# Patient Record
Sex: FEMALE | Race: WHITE | Employment: UNEMPLOYED | URBAN - METROPOLITAN AREA
[De-identification: names, ages, dates, MRNs, and addresses within clinical notes are randomized per-mention and may not be internally consistent; named-entity substitution may affect disease eponyms.]

---

## 2017-01-09 ENCOUNTER — TELEPHONE (OUTPATIENT)
Dept: NEUROLOGY | Facility: CLINIC | Age: 37
End: 2017-01-09

## 2017-01-09 ENCOUNTER — OFFICE VISIT (OUTPATIENT)
Dept: HEMATOLOGY/ONCOLOGY | Facility: HOSPITAL | Age: 37
End: 2017-01-09
Attending: Other
Payer: COMMERCIAL

## 2017-01-09 VITALS
WEIGHT: 147.63 LBS | SYSTOLIC BLOOD PRESSURE: 112 MMHG | RESPIRATION RATE: 20 BRPM | DIASTOLIC BLOOD PRESSURE: 66 MMHG | HEIGHT: 65 IN | HEART RATE: 70 BPM | BODY MASS INDEX: 24.6 KG/M2 | TEMPERATURE: 97 F

## 2017-01-09 DIAGNOSIS — G35 MULTIPLE SCLEROSIS (HCC): Primary | ICD-10-CM

## 2017-01-09 PROCEDURE — 96413 CHEMO IV INFUSION 1 HR: CPT

## 2017-01-09 NOTE — TELEPHONE ENCOUNTER
Tysabri authorized:  Authorization #[id number] from 4- through 5-. Last office visit on 7-29-15. Needs follow up. Patient notified of need for follow up appointment. Concerns over finances and states nothing has changed.  Discussed need to

## 2017-01-09 NOTE — PROGRESS NOTES
Education Record    Learner:  Patient    Disease / Diagnosis: MS    Barriers / Limitations:  None   Comments:    Method:  Brief focused   Comments:    General Topics:  Medication, Side effects and symptom management and Plan of care reviewed   Comments:

## 2017-01-17 ENCOUNTER — OFFICE VISIT (OUTPATIENT)
Dept: NEUROLOGY | Facility: CLINIC | Age: 37
End: 2017-01-17

## 2017-01-17 ENCOUNTER — TELEPHONE (OUTPATIENT)
Dept: NEUROLOGY | Facility: CLINIC | Age: 37
End: 2017-01-17

## 2017-01-17 VITALS
DIASTOLIC BLOOD PRESSURE: 70 MMHG | RESPIRATION RATE: 16 BRPM | WEIGHT: 149 LBS | BODY MASS INDEX: 24.83 KG/M2 | HEIGHT: 65 IN | HEART RATE: 68 BPM | SYSTOLIC BLOOD PRESSURE: 108 MMHG

## 2017-01-17 DIAGNOSIS — F41.9 ANXIETY AND DEPRESSION: ICD-10-CM

## 2017-01-17 DIAGNOSIS — G35 MULTIPLE SCLEROSIS (HCC): Primary | ICD-10-CM

## 2017-01-17 DIAGNOSIS — R53.83 OTHER FATIGUE: ICD-10-CM

## 2017-01-17 DIAGNOSIS — F32.A ANXIETY AND DEPRESSION: ICD-10-CM

## 2017-01-17 PROCEDURE — 99214 OFFICE O/P EST MOD 30 MIN: CPT | Performed by: PHYSICIAN ASSISTANT

## 2017-01-17 RX ORDER — MODAFINIL 200 MG/1
TABLET ORAL
Qty: 30 TABLET | Refills: 3 | Status: SHIPPED | OUTPATIENT
Start: 2017-01-17 | End: 2017-04-18

## 2017-01-17 RX ORDER — DULOXETIN HYDROCHLORIDE 30 MG/1
CAPSULE, DELAYED RELEASE ORAL
Qty: 60 CAPSULE | Refills: 5 | Status: SHIPPED | OUTPATIENT
Start: 2017-01-17 | End: 2017-07-26

## 2017-01-17 RX ORDER — HYDROCODONE BITARTRATE AND ACETAMINOPHEN 10; 325 MG/1; MG/1
1 TABLET ORAL EVERY 8 HOURS
COMMUNITY
Start: 2017-01-16

## 2017-01-17 NOTE — PROGRESS NOTES
HPI:    Patient ID: Jason Romano is a 39year old female. HPI     Patient is a 39year old female here for follow-up of MS. She is currently on the tysabri. She has been on it about 2 years now. She is tolerating it without side effects.  Previous is nervous/anxious.       Past Medical History   Diagnosis Date   • Closed fracture of lumbar vertebra without mention of spinal cord injury      Closed compression fracture of L2 vertebral body   • Multiple sclerosis (HCC)      No past surgical history on the left side. No drift on exam. FTN intact bilaterally. Slight difficulties with tandem gait. Slight swaying on romberg but able to correct. Skin: Skin is warm and dry.    Psychiatric: Her speech is normal and behavior is normal. Judgment and thought co

## 2017-01-17 NOTE — TELEPHONE ENCOUNTER
PA requested for modafinil. PA initiated via [email or web address], sent to plan, key: [id number] – PA Case ID: [id number] – Rx #: [id number]    Received approval via [employer]:    Approvedtoday  NSXMAE:[id number];ACPZTES Name:Modafinil (Provigil), Armodafinil (Nuv

## 2017-01-17 NOTE — PATIENT INSTRUCTIONS
Refill policies:    • Allow 2 business days for refills; controlled substances may take longer.   • Contact your pharmacy at least 5 days prior to running out of medication and have them send an electronic request or submit request through the “request re your physician has recommended that you have a procedure or additional testing performed. [facility]) will contact your insurance carrier to obtain pre-certification or prior authorization.     Unfortunately, MACIE has seen an increas

## 2017-01-17 NOTE — PROGRESS NOTES
Rx for Modafinil faxed to [facility] ([phone]) with confirmation received.

## 2017-01-31 ENCOUNTER — TELEPHONE (OUTPATIENT)
Dept: NEUROLOGY | Facility: CLINIC | Age: 37
End: 2017-01-31

## 2017-01-31 NOTE — TELEPHONE ENCOUNTER
Patient calling for orders for Tysabri. Orders entered in Therapy plan.   Patient authorized by touch program.

## 2017-02-01 NOTE — TELEPHONE ENCOUNTER
Patient calling to state [facility] needs orders. Patient notified that orders have been entered for six infusions.

## 2017-02-07 ENCOUNTER — OFFICE VISIT (OUTPATIENT)
Dept: HEMATOLOGY/ONCOLOGY | Facility: HOSPITAL | Age: 37
End: 2017-02-07
Attending: Other
Payer: COMMERCIAL

## 2017-02-07 VITALS
OXYGEN SATURATION: 100 % | DIASTOLIC BLOOD PRESSURE: 70 MMHG | BODY MASS INDEX: 23.74 KG/M2 | HEART RATE: 83 BPM | HEIGHT: 65 IN | RESPIRATION RATE: 18 BRPM | SYSTOLIC BLOOD PRESSURE: 109 MMHG | WEIGHT: 142.5 LBS | TEMPERATURE: 98 F

## 2017-02-07 DIAGNOSIS — G35 MULTIPLE SCLEROSIS (HCC): Primary | ICD-10-CM

## 2017-02-07 PROCEDURE — 96413 CHEMO IV INFUSION 1 HR: CPT

## 2017-02-07 NOTE — PROGRESS NOTES
Education Record    Learner:  Patient    Disease / Diagnosis:    Barriers / Limitations:  None   Comments:    Method:  Brief focused and Discussion   Comments:    General Topics:  Medication and Plan of care reviewed   Comments:    Outcome:  Shows Estephanie

## 2017-03-07 ENCOUNTER — APPOINTMENT (OUTPATIENT)
Dept: HEMATOLOGY/ONCOLOGY | Facility: HOSPITAL | Age: 37
End: 2017-03-07
Attending: Other
Payer: COMMERCIAL

## 2017-03-07 ENCOUNTER — TELEPHONE (OUTPATIENT)
Dept: HEMATOLOGY/ONCOLOGY | Facility: HOSPITAL | Age: 37
End: 2017-03-07

## 2017-03-20 ENCOUNTER — OFFICE VISIT (OUTPATIENT)
Dept: HEMATOLOGY/ONCOLOGY | Facility: HOSPITAL | Age: 37
End: 2017-03-20
Attending: Other
Payer: COMMERCIAL

## 2017-03-20 VITALS
TEMPERATURE: 98 F | RESPIRATION RATE: 18 BRPM | SYSTOLIC BLOOD PRESSURE: 106 MMHG | HEART RATE: 77 BPM | DIASTOLIC BLOOD PRESSURE: 62 MMHG

## 2017-03-20 DIAGNOSIS — G35 MULTIPLE SCLEROSIS (HCC): Primary | ICD-10-CM

## 2017-03-20 PROCEDURE — 96413 CHEMO IV INFUSION 1 HR: CPT

## 2017-03-20 NOTE — PROGRESS NOTES
Education Record    Learner:  Patient    Disease / Diagnosis: MS    Barriers / Limitations:  None   Comments:    Method:  Discussion and Printed material   Comments:    General Topics:  Medication, Side effects and symptom management and Plan of care revie

## 2017-04-18 ENCOUNTER — OFFICE VISIT (OUTPATIENT)
Dept: HEMATOLOGY/ONCOLOGY | Facility: HOSPITAL | Age: 37
End: 2017-04-18
Attending: Other
Payer: COMMERCIAL

## 2017-04-18 ENCOUNTER — TELEPHONE (OUTPATIENT)
Dept: NEUROLOGY | Facility: CLINIC | Age: 37
End: 2017-04-18

## 2017-04-18 VITALS
HEART RATE: 84 BPM | OXYGEN SATURATION: 100 % | RESPIRATION RATE: 18 BRPM | BODY MASS INDEX: 24.02 KG/M2 | WEIGHT: 144.19 LBS | DIASTOLIC BLOOD PRESSURE: 68 MMHG | SYSTOLIC BLOOD PRESSURE: 99 MMHG | HEIGHT: 65 IN | TEMPERATURE: 98 F

## 2017-04-18 DIAGNOSIS — G35 MULTIPLE SCLEROSIS (HCC): Primary | ICD-10-CM

## 2017-04-18 PROCEDURE — 96413 CHEMO IV INFUSION 1 HR: CPT

## 2017-04-18 RX ORDER — MODAFINIL 200 MG/1
TABLET ORAL
Qty: 30 TABLET | Refills: 3 | Status: SHIPPED
Start: 2017-04-18 | End: 2017-05-22

## 2017-04-18 NOTE — PROGRESS NOTES
Education Record    Learner:  Patient    Disease / Diagnosis:    Barriers / Limitations:  None   Comments:    Method:  Brief focused and Discussion   Comments:    General Topics:  Medication, Side effects and symptom management and Plan of care reviewed

## 2017-04-18 NOTE — TELEPHONE ENCOUNTER
Medication: Modafinil    Date of last refill: 1/17/2017 30/ with 3 refills  [place] verified fill on 4/17/2017 for 30 tabs/30 day supply    Last office visit: 1/17/2017  Due back to clinic per last office note:  RTC 6 mo

## 2017-04-18 NOTE — TELEPHONE ENCOUNTER
Tysabri authorization effective until 5/10/2017, needs new PA. Referral in [facility]. Case pending.

## 2017-04-19 ENCOUNTER — APPOINTMENT (OUTPATIENT)
Dept: HEMATOLOGY/ONCOLOGY | Facility: HOSPITAL | Age: 37
End: 2017-04-19
Attending: Other
Payer: COMMERCIAL

## 2017-04-24 NOTE — TELEPHONE ENCOUNTER
Called insurance [phone] now authorization with patients insurance has to go thru [place].  Spoke with Ayana she took all information and because she had to add the provider and facility it will take 24-48 hours to verify them, if they then needs cl

## 2017-05-05 NOTE — TELEPHONE ENCOUNTER
Received fax from [address] [place] [address] approved from 5/11/2017 - 11/6/17 - Authorization #[id number]

## 2017-05-22 RX ORDER — MODAFINIL 200 MG/1
TABLET ORAL
Qty: 30 TABLET | Refills: 3 | Status: SHIPPED | OUTPATIENT
Start: 2017-05-22 | End: 2017-10-16

## 2017-05-22 NOTE — TELEPHONE ENCOUNTER
Pt having issues with Rx. Originally sent to [facility]. Pt wanted it to go to [facility]. During the transfer Rx was lost. [facility] updated with new pharmacy info.

## 2017-05-23 ENCOUNTER — TELEPHONE (OUTPATIENT)
Dept: NEUROLOGY | Facility: CLINIC | Age: 37
End: 2017-05-23

## 2017-05-23 DIAGNOSIS — G35 MULTIPLE SCLEROSIS (HCC): Primary | ICD-10-CM

## 2017-05-23 NOTE — TELEPHONE ENCOUNTER
FDA mandated Tysabri reauthorization questionnaire completed online. Patient is JCV Negative, Index = 0.11. Last test date 12/9/2016, next test date due 06/9/2017. Has completed 33 Tysabri infusions with 0 courses of steroids in last 6 months.   Diana

## 2017-05-25 ENCOUNTER — APPOINTMENT (OUTPATIENT)
Dept: HEMATOLOGY/ONCOLOGY | Facility: HOSPITAL | Age: 37
End: 2017-05-25
Attending: Other
Payer: COMMERCIAL

## 2017-05-31 ENCOUNTER — OFFICE VISIT (OUTPATIENT)
Dept: HEMATOLOGY/ONCOLOGY | Facility: HOSPITAL | Age: 37
End: 2017-05-31
Attending: Other
Payer: COMMERCIAL

## 2017-05-31 VITALS
SYSTOLIC BLOOD PRESSURE: 109 MMHG | HEART RATE: 66 BPM | TEMPERATURE: 97 F | BODY MASS INDEX: 24 KG/M2 | DIASTOLIC BLOOD PRESSURE: 75 MMHG | WEIGHT: 146 LBS | RESPIRATION RATE: 18 BRPM | OXYGEN SATURATION: 100 %

## 2017-05-31 DIAGNOSIS — G35 MULTIPLE SCLEROSIS (HCC): Primary | ICD-10-CM

## 2017-05-31 PROCEDURE — 96413 CHEMO IV INFUSION 1 HR: CPT

## 2017-05-31 NOTE — PROGRESS NOTES
Education Record    Learner:  Patient    Disease / Diagnosis:MS    Barriers / Limitations:  None   Comments:    Method:  Discussion   Comments:    General Topics:  Side effects and symptom management, Plan of care reviewed and Fall risk and prevention   Co

## 2017-06-22 ENCOUNTER — TELEPHONE (OUTPATIENT)
Dept: NEUROLOGY | Facility: CLINIC | Age: 37
End: 2017-06-22

## 2017-06-28 ENCOUNTER — OFFICE VISIT (OUTPATIENT)
Dept: HEMATOLOGY/ONCOLOGY | Facility: HOSPITAL | Age: 37
End: 2017-06-28
Attending: Other
Payer: COMMERCIAL

## 2017-06-28 VITALS
TEMPERATURE: 97 F | HEART RATE: 71 BPM | BODY MASS INDEX: 23.27 KG/M2 | DIASTOLIC BLOOD PRESSURE: 58 MMHG | RESPIRATION RATE: 18 BRPM | SYSTOLIC BLOOD PRESSURE: 111 MMHG | OXYGEN SATURATION: 99 % | WEIGHT: 139.63 LBS | HEIGHT: 65 IN

## 2017-06-28 DIAGNOSIS — G35 MULTIPLE SCLEROSIS (HCC): Primary | ICD-10-CM

## 2017-06-28 PROCEDURE — 96413 CHEMO IV INFUSION 1 HR: CPT

## 2017-06-28 NOTE — PROGRESS NOTES
Education Record    Learner:  Patient    Disease / Diagnosis:MS    Barriers / Limitations:  None   Comments:    Method:  Discussion and Reinforcement   Comments:    General Topics:  Medication, Side effects and symptom management, Plan of care reviewed and

## 2017-07-03 LAB
INDEX VALUE: 0.13
JCV ANTIBODY: NEGATIVE

## 2017-07-26 ENCOUNTER — OFFICE VISIT (OUTPATIENT)
Dept: HEMATOLOGY/ONCOLOGY | Facility: HOSPITAL | Age: 37
End: 2017-07-26
Attending: Other
Payer: COMMERCIAL

## 2017-07-26 VITALS
OXYGEN SATURATION: 100 % | HEART RATE: 76 BPM | TEMPERATURE: 98 F | SYSTOLIC BLOOD PRESSURE: 103 MMHG | RESPIRATION RATE: 16 BRPM | DIASTOLIC BLOOD PRESSURE: 68 MMHG

## 2017-07-26 DIAGNOSIS — G35 MULTIPLE SCLEROSIS (HCC): Primary | ICD-10-CM

## 2017-07-26 PROCEDURE — 96413 CHEMO IV INFUSION 1 HR: CPT

## 2017-07-26 NOTE — PROGRESS NOTES
Pt arrived for tysabri infusion, pt states no to all questions pertaining to holding medication, pt alert and appears in nad, pt ambulates withs teady gait, pt denies any recent MS flare-ups    Education Record    Learner:  Patient    Disease / Diagnosis:M

## 2017-08-22 ENCOUNTER — TELEPHONE (OUTPATIENT)
Dept: HEMATOLOGY/ONCOLOGY | Facility: HOSPITAL | Age: 37
End: 2017-08-22

## 2017-08-22 ENCOUNTER — TELEPHONE (OUTPATIENT)
Dept: NEUROLOGY | Facility: CLINIC | Age: 37
End: 2017-08-22

## 2017-08-22 NOTE — TELEPHONE ENCOUNTER
S:  [facility] needs orders for Tysabri infusion tomorrow. B.  On Tysabri monthly infusion. Last infusion 07/26/17  Review of chart:      FDA mandated Tysabri reauthorization is up to date.   JCV current:  0/13 on 06/29/17.      Patient receives inf

## 2017-08-22 NOTE — TELEPHONE ENCOUNTER
Called Dr. Stanley Barker office and spoke to [address] [place] [address], asked to have Dr. Stanley Barker RN add more Tysabri orders in the therapy plan. Patient is scheduled for her next infusion tomorrow. Per Stacey, will let the RN know to put orders in the therapy plan.

## 2017-08-23 ENCOUNTER — APPOINTMENT (OUTPATIENT)
Dept: HEMATOLOGY/ONCOLOGY | Facility: HOSPITAL | Age: 37
End: 2017-08-23
Attending: Other
Payer: COMMERCIAL

## 2017-09-06 ENCOUNTER — OFFICE VISIT (OUTPATIENT)
Dept: HEMATOLOGY/ONCOLOGY | Facility: HOSPITAL | Age: 37
End: 2017-09-06
Attending: Other
Payer: COMMERCIAL

## 2017-09-06 VITALS
HEART RATE: 62 BPM | RESPIRATION RATE: 16 BRPM | OXYGEN SATURATION: 100 % | DIASTOLIC BLOOD PRESSURE: 65 MMHG | TEMPERATURE: 98 F | SYSTOLIC BLOOD PRESSURE: 99 MMHG

## 2017-09-06 DIAGNOSIS — G35 MULTIPLE SCLEROSIS (HCC): Primary | ICD-10-CM

## 2017-09-06 PROCEDURE — 96413 CHEMO IV INFUSION 1 HR: CPT

## 2017-09-06 NOTE — PROGRESS NOTES
Education Record    Learner:  Patient    Disease / Diagnosis:MS  Barriers / Limitations:  None   Comments:    Method:  Discussion   Comments:    General Topics:  Medication, Side effects and symptom management, Plan of care reviewed and Fall risk and preve

## 2017-10-04 ENCOUNTER — APPOINTMENT (OUTPATIENT)
Dept: HEMATOLOGY/ONCOLOGY | Facility: HOSPITAL | Age: 37
End: 2017-10-04
Attending: Other
Payer: COMMERCIAL

## 2017-10-11 ENCOUNTER — OFFICE VISIT (OUTPATIENT)
Dept: HEMATOLOGY/ONCOLOGY | Facility: HOSPITAL | Age: 37
End: 2017-10-11
Attending: Other
Payer: COMMERCIAL

## 2017-10-11 VITALS
HEIGHT: 65 IN | WEIGHT: 136.63 LBS | HEART RATE: 67 BPM | RESPIRATION RATE: 16 BRPM | TEMPERATURE: 97 F | SYSTOLIC BLOOD PRESSURE: 119 MMHG | BODY MASS INDEX: 22.76 KG/M2 | DIASTOLIC BLOOD PRESSURE: 70 MMHG

## 2017-10-11 DIAGNOSIS — G35 MULTIPLE SCLEROSIS (HCC): Primary | ICD-10-CM

## 2017-10-11 PROCEDURE — 96413 CHEMO IV INFUSION 1 HR: CPT

## 2017-10-11 PROCEDURE — 96365 THER/PROPH/DIAG IV INF INIT: CPT

## 2017-10-11 NOTE — PROGRESS NOTES
Education Record    Learner:  Patient    Disease / Diagnosis:MS    Barriers / Limitations:  None   Comments:    Method:  Discussion   Comments:    General Topics:  Infection, Medication, Precautions, Side effects and symptom management, Plan of care review

## 2017-10-16 DIAGNOSIS — G35 MULTIPLE SCLEROSIS (HCC): ICD-10-CM

## 2017-10-16 RX ORDER — MODAFINIL 200 MG/1
TABLET ORAL
Qty: 30 TABLET | Refills: 3 | Status: SHIPPED
Start: 2017-10-16 | End: 2018-04-02

## 2017-10-16 NOTE — TELEPHONE ENCOUNTER
Prescription approved for Provigil and faxed to [facility] pharmacy with receipt confirmation.

## 2017-10-16 NOTE — TELEPHONE ENCOUNTER
Medication: Modafinil    Date of last refill: 5/22/2017  [place] for this medication    Last office visit: 1/17/2017  Due back to clinic per last office note:  RTC in 6 months, due back July/Aug 2017  Date next office

## 2017-11-07 ENCOUNTER — TELEPHONE (OUTPATIENT)
Dept: NEUROLOGY | Facility: CLINIC | Age: 37
End: 2017-11-07

## 2017-11-07 DIAGNOSIS — G35 MULTIPLE SCLEROSIS (HCC): Primary | ICD-10-CM

## 2017-11-07 NOTE — TELEPHONE ENCOUNTER
FDA mandated Tysabri reauthorization questionnaire completed online. Patient is JCV Negative, Index = 0.13. Last test date 6/29/2017, next test date due 1/2018. Has completed 38 Tysabri infusions with 0 courses of steroids in last 6 months.   Reauthorized

## 2017-11-08 NOTE — TELEPHONE ENCOUNTER
Called insurance [phone] and spoke with Mary Jane Davison she said this needs to go thru [place] (she can not give me their # she said when we call [phone] press #4)    Was transferred I spoke with Teresa Hill she said that she will emmy this as Urgent [facility]

## 2017-11-09 ENCOUNTER — APPOINTMENT (OUTPATIENT)
Dept: HEMATOLOGY/ONCOLOGY | Facility: HOSPITAL | Age: 37
End: 2017-11-09
Attending: Other
Payer: COMMERCIAL

## 2017-11-09 ENCOUNTER — TELEPHONE (OUTPATIENT)
Dept: HEMATOLOGY/ONCOLOGY | Facility: HOSPITAL | Age: 37
End: 2017-11-09

## 2017-11-09 NOTE — TELEPHONE ENCOUNTER
Received request for information needed prior to approval of Tysabri. Called [facility] and spoke with Tamera Rodgers Pharm-D to provide clarification of patient's status with Tysabri. Approval received 11/8/17-05/06/18 for total #7 doses.   Authorization #

## 2017-11-09 NOTE — TELEPHONE ENCOUNTER
CALLED PT AND LEFT FOLLOWING MESSAGE: PT HAD APPT TODAY AT [facility] IN [place] AT 11AM FOR TYSABRI INFUSION AND WAS A NO SHOW. LEFT MESSAGE TO CALL OUR OFFICE AND LET US KNOW IF SHE WANTS TO RESCHEDULE.

## 2017-12-04 ENCOUNTER — OFFICE VISIT (OUTPATIENT)
Dept: HEMATOLOGY/ONCOLOGY | Facility: HOSPITAL | Age: 37
End: 2017-12-04
Attending: Other
Payer: COMMERCIAL

## 2017-12-04 VITALS
SYSTOLIC BLOOD PRESSURE: 107 MMHG | OXYGEN SATURATION: 98 % | TEMPERATURE: 98 F | HEART RATE: 67 BPM | RESPIRATION RATE: 16 BRPM | DIASTOLIC BLOOD PRESSURE: 68 MMHG

## 2017-12-04 DIAGNOSIS — G35 MULTIPLE SCLEROSIS (HCC): Primary | ICD-10-CM

## 2017-12-04 PROCEDURE — 96413 CHEMO IV INFUSION 1 HR: CPT

## 2017-12-04 PROCEDURE — 96365 THER/PROPH/DIAG IV INF INIT: CPT

## 2017-12-04 NOTE — PROGRESS NOTES
Education Record    Learner:  Patient    Disease / Diagnosis: Multiple sclerosis     Barriers / Limitations:  None   Comments:    Method:  Brief focused and Reinforcement   Comments:    General Topics:  Plan of care reviewed   Comments:    Outcome:  Shows

## 2017-12-07 ENCOUNTER — TELEPHONE (OUTPATIENT)
Dept: NEUROLOGY | Facility: CLINIC | Age: 37
End: 2017-12-07

## 2018-01-03 ENCOUNTER — TELEPHONE (OUTPATIENT)
Dept: NEUROLOGY | Facility: CLINIC | Age: 38
End: 2018-01-03

## 2018-01-03 DIAGNOSIS — G35 MULTIPLE SCLEROSIS (HCC): Primary | ICD-10-CM

## 2018-01-03 NOTE — TELEPHONE ENCOUNTER
Rec'd approval via [facility]: CaseId:[id number];Product Name:Modafinil (Provigil), Armodafinil (Nuvigil) [employer];Status:Approved; Coverage Start Date:12/04/2017;Coverage End Date:01/03/2019;

## 2018-01-08 ENCOUNTER — TELEPHONE (OUTPATIENT)
Dept: NEUROLOGY | Facility: CLINIC | Age: 38
End: 2018-01-08

## 2018-01-09 ENCOUNTER — OFFICE VISIT (OUTPATIENT)
Dept: HEMATOLOGY/ONCOLOGY | Facility: HOSPITAL | Age: 38
End: 2018-01-09
Attending: Other
Payer: COMMERCIAL

## 2018-01-09 VITALS
OXYGEN SATURATION: 96 % | RESPIRATION RATE: 20 BRPM | SYSTOLIC BLOOD PRESSURE: 115 MMHG | WEIGHT: 142 LBS | HEART RATE: 71 BPM | DIASTOLIC BLOOD PRESSURE: 76 MMHG | BODY MASS INDEX: 23.66 KG/M2 | HEIGHT: 65 IN | TEMPERATURE: 98 F

## 2018-01-09 DIAGNOSIS — G35 MULTIPLE SCLEROSIS (HCC): Primary | ICD-10-CM

## 2018-01-09 PROCEDURE — 96413 CHEMO IV INFUSION 1 HR: CPT

## 2018-01-09 PROCEDURE — 96365 THER/PROPH/DIAG IV INF INIT: CPT

## 2018-01-09 NOTE — PROGRESS NOTES
Pt given Tysabri over 1 hour. Pt refused to wait for 1 hr observation. VS post infusion: T-98.2, HR-71, RR-20, B/P 115/76. No complaints. Pt given next appt in 4 weeks.

## 2018-01-09 NOTE — PROGRESS NOTES
Education Record     Learner:  Patient     Disease / Diagnosis:MS     Barriers / Limitations:  None              Comments:     Method:  Brief focused and Reinforcement              Comments:     General Topics:  Medication, Side effects and symptom managem

## 2018-01-12 LAB
INDEX VALUE: 0.1
JCV ANTIBODY: NEGATIVE

## 2018-02-06 ENCOUNTER — OFFICE VISIT (OUTPATIENT)
Dept: HEMATOLOGY/ONCOLOGY | Facility: HOSPITAL | Age: 38
End: 2018-02-06
Attending: Other
Payer: COMMERCIAL

## 2018-02-06 VITALS
TEMPERATURE: 98 F | OXYGEN SATURATION: 100 % | BODY MASS INDEX: 24 KG/M2 | HEART RATE: 89 BPM | RESPIRATION RATE: 18 BRPM | WEIGHT: 142 LBS | DIASTOLIC BLOOD PRESSURE: 71 MMHG | SYSTOLIC BLOOD PRESSURE: 105 MMHG

## 2018-02-06 DIAGNOSIS — G35 MULTIPLE SCLEROSIS (HCC): Primary | ICD-10-CM

## 2018-02-06 PROCEDURE — 96365 THER/PROPH/DIAG IV INF INIT: CPT

## 2018-03-06 ENCOUNTER — APPOINTMENT (OUTPATIENT)
Dept: HEMATOLOGY/ONCOLOGY | Facility: HOSPITAL | Age: 38
End: 2018-03-06
Attending: Other
Payer: COMMERCIAL

## 2018-03-12 ENCOUNTER — TELEPHONE (OUTPATIENT)
Dept: HEMATOLOGY/ONCOLOGY | Facility: HOSPITAL | Age: 38
End: 2018-03-12

## 2018-03-15 ENCOUNTER — OFFICE VISIT (OUTPATIENT)
Dept: HEMATOLOGY/ONCOLOGY | Facility: HOSPITAL | Age: 38
End: 2018-03-15
Attending: Other
Payer: COMMERCIAL

## 2018-03-15 ENCOUNTER — TELEPHONE (OUTPATIENT)
Dept: NEUROLOGY | Facility: CLINIC | Age: 38
End: 2018-03-15

## 2018-03-15 DIAGNOSIS — G35 MULTIPLE SCLEROSIS (HCC): Primary | ICD-10-CM

## 2018-03-15 PROCEDURE — 96365 THER/PROPH/DIAG IV INF INIT: CPT

## 2018-03-15 NOTE — PROGRESS NOTES
Education Record    Learner:  Patient and Family Member    Disease / Diagnosis: MS    Barriers / Limitations:  None   Comments:    Method:  Brief focused   Comments:    General Topics:  Plan of care reviewed   Comments:    Outcome:  Shows understanding   C

## 2018-04-02 DIAGNOSIS — G35 MULTIPLE SCLEROSIS (HCC): ICD-10-CM

## 2018-04-02 RX ORDER — MODAFINIL 200 MG/1
TABLET ORAL
Qty: 30 TABLET | Refills: 0 | Status: SHIPPED
Start: 2018-04-02 | End: 2018-05-04

## 2018-04-02 NOTE — TELEPHONE ENCOUNTER
Medication: modafinil    Date of last refill: 10/16/17  [place] 1[place]    Last office visit: 1/17/17  Due back to clinic per last office note:  6 months  Date next office visit scheduled:  No future appointments.   Ibeth

## 2018-04-23 ENCOUNTER — OFFICE VISIT (OUTPATIENT)
Dept: HEMATOLOGY/ONCOLOGY | Facility: HOSPITAL | Age: 38
End: 2018-04-23
Attending: Other
Payer: COMMERCIAL

## 2018-04-23 VITALS
OXYGEN SATURATION: 100 % | SYSTOLIC BLOOD PRESSURE: 102 MMHG | HEIGHT: 65 IN | DIASTOLIC BLOOD PRESSURE: 67 MMHG | TEMPERATURE: 98 F | RESPIRATION RATE: 18 BRPM | HEART RATE: 66 BPM

## 2018-04-23 DIAGNOSIS — G35 MULTIPLE SCLEROSIS (HCC): Primary | ICD-10-CM

## 2018-04-23 PROCEDURE — 96365 THER/PROPH/DIAG IV INF INIT: CPT

## 2018-05-04 DIAGNOSIS — G35 MULTIPLE SCLEROSIS (HCC): ICD-10-CM

## 2018-05-04 NOTE — TELEPHONE ENCOUNTER
Medication: Modafinil 200 mg     Date of last refill: 04/02/18  [place]     Last office visit: 1/17/17  Due back to clinic per last office note:  6 months  Date next office visit scheduled:  No future appointments.   Carline

## 2018-05-07 RX ORDER — MODAFINIL 200 MG/1
TABLET ORAL
Qty: 30 TABLET | Refills: 0 | Status: SHIPPED
Start: 2018-05-07 | End: 2018-05-14

## 2018-05-14 ENCOUNTER — OFFICE VISIT (OUTPATIENT)
Dept: NEUROLOGY | Facility: CLINIC | Age: 38
End: 2018-05-14

## 2018-05-14 VITALS — DIASTOLIC BLOOD PRESSURE: 68 MMHG | RESPIRATION RATE: 18 BRPM | SYSTOLIC BLOOD PRESSURE: 98 MMHG | HEART RATE: 72 BPM

## 2018-05-14 DIAGNOSIS — M25.512 PAIN IN JOINT OF LEFT SHOULDER: ICD-10-CM

## 2018-05-14 DIAGNOSIS — G35 MULTIPLE SCLEROSIS (HCC): Primary | ICD-10-CM

## 2018-05-14 PROCEDURE — 99214 OFFICE O/P EST MOD 30 MIN: CPT | Performed by: PHYSICIAN ASSISTANT

## 2018-05-14 RX ORDER — MULTIVIT-MIN/IRON/FOLIC ACID/K 18-600-40
CAPSULE ORAL DAILY
COMMUNITY

## 2018-05-14 RX ORDER — DIAZEPAM 5 MG/1
TABLET ORAL
Qty: 2 TABLET | Refills: 0 | Status: SHIPPED | OUTPATIENT
Start: 2018-05-14 | End: 2019-06-24

## 2018-05-14 RX ORDER — MODAFINIL 200 MG/1
TABLET ORAL
Qty: 90 TABLET | Refills: 2 | Status: SHIPPED | OUTPATIENT
Start: 2018-05-14 | End: 2019-05-07

## 2018-05-14 NOTE — PROGRESS NOTES
[facility] with [address]  8/7/1980  Primary Care Provider:  None Pcp    5/14/2018    40year old female patient being seen for: MS    Patient states she has been stable.  Sh medications, lab results and radiology results, and the following relevant information are as noted in appropriate sections above and below.       EXAM:  BP 98/68   Pulse 72   Resp 18   Looks stated age  Pink conjunctiva anicteric sclerae, moist mucosa  No approximately:  ( ) 6-8 weeks    ( ) 3-4 months     () 6-8 months   (X ) yearly   ( ) As needed but knows to call if there are problems  ( ) Followup for special test  /or keep scheduled appointment  Patient understands that if needed, based on condition a

## 2018-05-14 NOTE — PATIENT INSTRUCTIONS
Refill policies:    • Allow 2-3 business days for refills; controlled substances may take longer.   • Contact your pharmacy at least 5 days prior to running out of medication and have them send an electronic request or submit request through the “request re entire amount billed. Precertification and Prior Authorizations: If your physician has recommended that you have a procedure or additional testing performed.   [facility]) will contact your insurance carrier to obtain pre-certi

## 2018-05-15 ENCOUNTER — TELEPHONE (OUTPATIENT)
Dept: NEUROLOGY | Facility: CLINIC | Age: 38
End: 2018-05-15

## 2018-05-15 NOTE — TELEPHONE ENCOUNTER
FDA mandated Tysabri reauthorization questionnaire completed online. Patient is JCV Negative, Index = 0.10. Last test date 1/9/2018, next test date due July 2018, given lab request at office visit.   Has completed 43 Tysabri infusions with 0 courses of tanika

## 2018-05-16 NOTE — TELEPHONE ENCOUNTER
Called insurance transferred to / [facility] #4 and spoke with Precious she took all information for re authorization of Tysabri .     This has to go through provider network verifcation (this is an out of state plan) then we will receive infor

## 2018-05-21 NOTE — TELEPHONE ENCOUNTER
Fax received from [employer]. Reed Agustin has been approved for this member effective 5/16/2018 through 11/11/2018.     If member requires additional services beyond Nov 11, 2018, please contact [place] utilization management before the last ap

## 2018-06-08 ENCOUNTER — OFFICE VISIT (OUTPATIENT)
Dept: HEMATOLOGY/ONCOLOGY | Facility: HOSPITAL | Age: 38
End: 2018-06-08
Attending: Other
Payer: COMMERCIAL

## 2018-06-08 VITALS
HEART RATE: 81 BPM | SYSTOLIC BLOOD PRESSURE: 111 MMHG | DIASTOLIC BLOOD PRESSURE: 74 MMHG | TEMPERATURE: 98 F | RESPIRATION RATE: 18 BRPM

## 2018-06-08 DIAGNOSIS — G35 MULTIPLE SCLEROSIS (HCC): Primary | ICD-10-CM

## 2018-06-08 PROCEDURE — 96365 THER/PROPH/DIAG IV INF INIT: CPT

## 2018-06-13 ENCOUNTER — TELEPHONE (OUTPATIENT)
Dept: NEUROLOGY | Facility: CLINIC | Age: 38
End: 2018-06-13

## 2018-07-05 ENCOUNTER — TELEPHONE (OUTPATIENT)
Dept: NEUROLOGY | Facility: CLINIC | Age: 38
End: 2018-07-05

## 2018-07-05 NOTE — TELEPHONE ENCOUNTER
Current MS patient who infuses monthly with Tysabri. [address] calling for new therapy plan to be placed in [facility]. Therapy plan updated with 6 more infusions. Insurance authorization is valid through Nov 2018.

## 2018-07-06 ENCOUNTER — TELEPHONE (OUTPATIENT)
Dept: HEMATOLOGY/ONCOLOGY | Facility: HOSPITAL | Age: 38
End: 2018-07-06

## 2018-07-19 ENCOUNTER — TELEPHONE (OUTPATIENT)
Dept: NEUROLOGY | Facility: CLINIC | Age: 38
End: 2018-07-19

## 2018-07-26 ENCOUNTER — TELEPHONE (OUTPATIENT)
Dept: NEUROLOGY | Facility: CLINIC | Age: 38
End: 2018-07-26

## 2018-07-30 NOTE — TELEPHONE ENCOUNTER
Called [facility] and spoke with Jasmine Thomas for ,[id number] both codes are valid and billable no authorization or pre determination needed. Call reference #[id number].  Time on call 4:51

## 2018-08-02 ENCOUNTER — OFFICE VISIT (OUTPATIENT)
Dept: HEMATOLOGY/ONCOLOGY | Facility: HOSPITAL | Age: 38
End: 2018-08-02
Attending: Other
Payer: COMMERCIAL

## 2018-08-02 VITALS
RESPIRATION RATE: 18 BRPM | SYSTOLIC BLOOD PRESSURE: 119 MMHG | TEMPERATURE: 98 F | HEART RATE: 69 BPM | DIASTOLIC BLOOD PRESSURE: 78 MMHG

## 2018-08-02 DIAGNOSIS — G35 MULTIPLE SCLEROSIS (HCC): Primary | ICD-10-CM

## 2018-08-02 PROCEDURE — 96365 THER/PROPH/DIAG IV INF INIT: CPT

## 2018-08-02 RX ORDER — DIPHENHYDRAMINE HYDROCHLORIDE 50 MG/ML
25 INJECTION INTRAMUSCULAR; INTRAVENOUS ONCE
Status: CANCELLED | OUTPATIENT
Start: 2018-08-02

## 2018-08-02 RX ORDER — FAMOTIDINE 10 MG/ML
20 INJECTION, SOLUTION INTRAVENOUS ONCE
Status: CANCELLED | OUTPATIENT
Start: 2018-08-02

## 2018-08-02 RX ORDER — METHYLPREDNISOLONE SODIUM SUCCINATE 125 MG/2ML
125 INJECTION, POWDER, LYOPHILIZED, FOR SOLUTION INTRAMUSCULAR; INTRAVENOUS ONCE
Status: CANCELLED | OUTPATIENT
Start: 2018-08-02

## 2018-08-02 RX ORDER — METHYLPREDNISOLONE SODIUM SUCCINATE 40 MG/ML
40 INJECTION, POWDER, LYOPHILIZED, FOR SOLUTION INTRAMUSCULAR; INTRAVENOUS ONCE
Status: CANCELLED | OUTPATIENT
Start: 2018-08-02

## 2018-08-02 RX ORDER — MEPERIDINE HYDROCHLORIDE 25 MG/ML
25 INJECTION INTRAMUSCULAR; INTRAVENOUS; SUBCUTANEOUS ONCE
Status: CANCELLED | OUTPATIENT
Start: 2018-08-02

## 2018-08-02 NOTE — PROGRESS NOTES
Education Record    Learner:  Patient    Disease / Diagnosis: Multiple Sclerosis    Barriers / Limitations:  None   Comments:    Method:  Brief focused and Discussion   Comments:    General Topics:  Medication and Plan of care reviewed   Comments:    Rena Rivera

## 2018-08-30 ENCOUNTER — APPOINTMENT (OUTPATIENT)
Dept: HEMATOLOGY/ONCOLOGY | Facility: HOSPITAL | Age: 38
End: 2018-08-30
Attending: Other
Payer: COMMERCIAL

## 2018-09-17 ENCOUNTER — OFFICE VISIT (OUTPATIENT)
Dept: HEMATOLOGY/ONCOLOGY | Facility: HOSPITAL | Age: 38
End: 2018-09-17
Attending: Other
Payer: COMMERCIAL

## 2018-09-17 VITALS
OXYGEN SATURATION: 98 % | TEMPERATURE: 100 F | HEART RATE: 80 BPM | RESPIRATION RATE: 18 BRPM | SYSTOLIC BLOOD PRESSURE: 125 MMHG | DIASTOLIC BLOOD PRESSURE: 75 MMHG

## 2018-09-17 DIAGNOSIS — G35 MULTIPLE SCLEROSIS (HCC): Primary | ICD-10-CM

## 2018-09-17 PROCEDURE — 96365 THER/PROPH/DIAG IV INF INIT: CPT

## 2018-09-17 RX ORDER — MEPERIDINE HYDROCHLORIDE 25 MG/ML
25 INJECTION INTRAMUSCULAR; INTRAVENOUS; SUBCUTANEOUS ONCE
Status: CANCELLED | OUTPATIENT
Start: 2018-09-17

## 2018-09-17 RX ORDER — METHYLPREDNISOLONE SODIUM SUCCINATE 40 MG/ML
40 INJECTION, POWDER, LYOPHILIZED, FOR SOLUTION INTRAMUSCULAR; INTRAVENOUS ONCE
Status: CANCELLED | OUTPATIENT
Start: 2018-09-17

## 2018-09-17 RX ORDER — DIPHENHYDRAMINE HYDROCHLORIDE 50 MG/ML
25 INJECTION INTRAMUSCULAR; INTRAVENOUS ONCE
Status: CANCELLED | OUTPATIENT
Start: 2018-09-17

## 2018-09-17 RX ORDER — FAMOTIDINE 10 MG/ML
20 INJECTION, SOLUTION INTRAVENOUS ONCE
Status: CANCELLED | OUTPATIENT
Start: 2018-09-17

## 2018-09-17 RX ORDER — METHYLPREDNISOLONE SODIUM SUCCINATE 125 MG/2ML
125 INJECTION, POWDER, LYOPHILIZED, FOR SOLUTION INTRAMUSCULAR; INTRAVENOUS ONCE
Status: CANCELLED | OUTPATIENT
Start: 2018-09-17

## 2018-09-17 NOTE — PROGRESS NOTES
Education Record    Learner:  Patient and Spouse    Disease / Diagnosis: Multiple Sclerosis    Barriers / Limitations:  None   Comments:    Method:  Brief focused   Comments:    General Topics:  Plan of care reviewed   Comments:    Outcome:  Shows Estephanie

## 2018-10-15 ENCOUNTER — APPOINTMENT (OUTPATIENT)
Dept: HEMATOLOGY/ONCOLOGY | Facility: HOSPITAL | Age: 38
End: 2018-10-15
Attending: Other
Payer: COMMERCIAL

## 2018-11-01 ENCOUNTER — TELEPHONE (OUTPATIENT)
Dept: NEUROLOGY | Facility: CLINIC | Age: 38
End: 2018-11-01

## 2018-11-01 DIAGNOSIS — G35 MULTIPLE SCLEROSIS (HCC): Primary | ICD-10-CM

## 2018-11-01 NOTE — TELEPHONE ENCOUNTER
Request received for patient's Modafinil. Prior authorization completed through cover my meds. Authorization granted effective 11/1/2018 through 11/1/2019.     Pharmacy notified of approval.

## 2018-11-05 ENCOUNTER — TELEPHONE (OUTPATIENT)
Dept: NEUROLOGY | Facility: CLINIC | Age: 38
End: 2018-11-05

## 2018-11-06 NOTE — TELEPHONE ENCOUNTER
Faxed predetermination to [facility] with clinicals for codes [id number]. Confirmed fax received.

## 2018-11-13 NOTE — TELEPHONE ENCOUNTER
Authorization previously approved. Effective 11/1/2018 through 11/1/2019. Faxed to pharmacy with receipt confirmation.

## 2018-11-13 NOTE — TELEPHONE ENCOUNTER
Prior authorization resubmitted with new insurance. Case Key [id number] pending through cover my meds.

## 2018-11-20 NOTE — TELEPHONE ENCOUNTER
Fax received from [employer]. Edna Schwarz has been approved for use at [facility] effective:    11/09/2018 through 11/09/2019.

## 2018-12-03 ENCOUNTER — TELEPHONE (OUTPATIENT)
Dept: NEUROLOGY | Facility: CLINIC | Age: 38
End: 2018-12-03

## 2018-12-03 ENCOUNTER — OFFICE VISIT (OUTPATIENT)
Dept: HEMATOLOGY/ONCOLOGY | Facility: HOSPITAL | Age: 38
End: 2018-12-03
Attending: Other
Payer: COMMERCIAL

## 2018-12-03 VITALS
SYSTOLIC BLOOD PRESSURE: 109 MMHG | TEMPERATURE: 96 F | HEART RATE: 78 BPM | RESPIRATION RATE: 16 BRPM | DIASTOLIC BLOOD PRESSURE: 64 MMHG

## 2018-12-03 DIAGNOSIS — G35 MULTIPLE SCLEROSIS (HCC): Primary | ICD-10-CM

## 2018-12-03 PROCEDURE — 96365 THER/PROPH/DIAG IV INF INIT: CPT

## 2018-12-03 RX ORDER — FAMOTIDINE 10 MG/ML
20 INJECTION, SOLUTION INTRAVENOUS ONCE
Status: CANCELLED | OUTPATIENT
Start: 2018-12-03

## 2018-12-03 RX ORDER — METHYLPREDNISOLONE SODIUM SUCCINATE 40 MG/ML
40 INJECTION, POWDER, LYOPHILIZED, FOR SOLUTION INTRAMUSCULAR; INTRAVENOUS ONCE
Status: CANCELLED | OUTPATIENT
Start: 2018-12-03

## 2018-12-03 RX ORDER — DIPHENHYDRAMINE HYDROCHLORIDE 50 MG/ML
25 INJECTION INTRAMUSCULAR; INTRAVENOUS ONCE
Status: CANCELLED | OUTPATIENT
Start: 2018-12-03

## 2018-12-03 RX ORDER — METHYLPREDNISOLONE SODIUM SUCCINATE 125 MG/2ML
125 INJECTION, POWDER, LYOPHILIZED, FOR SOLUTION INTRAMUSCULAR; INTRAVENOUS ONCE
Status: CANCELLED | OUTPATIENT
Start: 2018-12-03

## 2018-12-03 RX ORDER — MEPERIDINE HYDROCHLORIDE 25 MG/ML
25 INJECTION INTRAMUSCULAR; INTRAVENOUS; SUBCUTANEOUS ONCE
Status: CANCELLED | OUTPATIENT
Start: 2018-12-03

## 2018-12-03 NOTE — TELEPHONE ENCOUNTER
Patient has Tysabri infusion today and requires re-authorization in Touch program.    Patient is due for JCV testing, last performed 1/9/18. Order in system. Order mailed to patient's home address.     Patient re-authorized via [email or web address] to continue

## 2018-12-03 NOTE — PROGRESS NOTES
Education Record    Learner:  Patient    Disease / Diagnosis:MS    Barriers / Limitations:  None   Comments:    Method:  Discussion   Comments:    General Topics:  Medication   Comments:    Outcome:  Shows understanding   Comments:

## 2018-12-04 NOTE — TELEPHONE ENCOUNTER
FDA mandated Tysabri reauthorization questionnaire completed online. Patient is JCV Neg, Index = 0.1. Last test date Jan 2018, next test date due now. Has completed 47 Tysabri infusions with 0 courses of steroids in last 6 months.   Reauthorized to contin

## 2018-12-11 ENCOUNTER — TELEPHONE (OUTPATIENT)
Dept: NEUROLOGY | Facility: CLINIC | Age: 38
End: 2018-12-11

## 2018-12-31 ENCOUNTER — APPOINTMENT (OUTPATIENT)
Dept: HEMATOLOGY/ONCOLOGY | Facility: HOSPITAL | Age: 38
End: 2018-12-31
Attending: Other
Payer: COMMERCIAL

## 2019-02-07 ENCOUNTER — NURSE ONLY (OUTPATIENT)
Dept: FAMILY MEDICINE CLINIC | Facility: CLINIC | Age: 39
End: 2019-02-07
Payer: COMMERCIAL

## 2019-02-07 VITALS
SYSTOLIC BLOOD PRESSURE: 100 MMHG | HEART RATE: 76 BPM | WEIGHT: 150 LBS | BODY MASS INDEX: 24.99 KG/M2 | HEIGHT: 65 IN | RESPIRATION RATE: 16 BRPM | OXYGEN SATURATION: 99 % | DIASTOLIC BLOOD PRESSURE: 70 MMHG | TEMPERATURE: 98 F

## 2019-02-07 DIAGNOSIS — H92.02 OTALGIA, LEFT: ICD-10-CM

## 2019-02-07 DIAGNOSIS — J02.9 ACUTE PHARYNGITIS, UNSPECIFIED ETIOLOGY: Primary | ICD-10-CM

## 2019-02-07 LAB
CONTROL LINE PRESENT WITH A CLEAR BACKGROUND (YES/NO): YES YES/NO
STREP GRP A CUL-SCR: NEGATIVE

## 2019-02-07 PROCEDURE — 87880 STREP A ASSAY W/OPTIC: CPT | Performed by: NURSE PRACTITIONER

## 2019-02-07 PROCEDURE — 99213 OFFICE O/P EST LOW 20 MIN: CPT | Performed by: NURSE PRACTITIONER

## 2019-02-07 PROCEDURE — 87081 CULTURE SCREEN ONLY: CPT | Performed by: NURSE PRACTITIONER

## 2019-02-07 NOTE — PROGRESS NOTES
CHIEF COMPLAINT:   Patient presents with:  Ear Pain  Sore Throat        HPI:   Kelle Le is a 45year old female presents to clinic with complaint of sore throat. Reports yesterday had mild left sided sore throat but resolved later in the day. SKIN: denies any unusual skin lesions or rashes  HEENT: See HPI  RESPIRATORY: denies shortness of breath or wheezing  CARDIOVASCULAR: denies chest pain or palpitations   GI: denies vomiting or diarrhea  NEURO: denies dizziness or lightheadedness    EXAM: Will send throat culture. Discussed possibility of mononucleosis infection, but at this time symptoms are not reflective of mono infection. If s/sx persist will consider MonoSpot or further lab work.    Comfort measures explained and discussed as listed i · Use the antibiotic medicine for the full 10 days. Do not stop the medicine even if you or your child feel better. This is very important to make sure the infection is fully treated. It is also important to prevent medicine-resistant germs from growing.  I ? Your child is younger than 3years of age and has a fever of 100.4°F (38°C) for more than 1 day. ? Your child is 3years old or older and has a fever of 100.4°F (38°C) for more than 3 days.   · New or worsening ear pain, sinus pain, or headache  · Painfu

## 2019-02-10 ENCOUNTER — TELEPHONE (OUTPATIENT)
Dept: FAMILY MEDICINE CLINIC | Facility: CLINIC | Age: 39
End: 2019-02-10

## 2019-02-20 ENCOUNTER — OFFICE VISIT (OUTPATIENT)
Dept: HEMATOLOGY/ONCOLOGY | Facility: HOSPITAL | Age: 39
End: 2019-02-20
Attending: Other
Payer: COMMERCIAL

## 2019-02-20 VITALS
RESPIRATION RATE: 18 BRPM | TEMPERATURE: 98 F | HEART RATE: 98 BPM | DIASTOLIC BLOOD PRESSURE: 83 MMHG | SYSTOLIC BLOOD PRESSURE: 138 MMHG | OXYGEN SATURATION: 100 %

## 2019-02-20 DIAGNOSIS — G35 MULTIPLE SCLEROSIS (HCC): Primary | ICD-10-CM

## 2019-02-20 PROCEDURE — 96365 THER/PROPH/DIAG IV INF INIT: CPT

## 2019-02-20 RX ORDER — MEPERIDINE HYDROCHLORIDE 25 MG/ML
25 INJECTION INTRAMUSCULAR; INTRAVENOUS; SUBCUTANEOUS ONCE
Status: CANCELLED | OUTPATIENT
Start: 2019-02-20

## 2019-02-20 RX ORDER — FAMOTIDINE 10 MG/ML
20 INJECTION, SOLUTION INTRAVENOUS ONCE
Status: CANCELLED | OUTPATIENT
Start: 2019-02-20

## 2019-02-20 RX ORDER — DIPHENHYDRAMINE HYDROCHLORIDE 50 MG/ML
25 INJECTION INTRAMUSCULAR; INTRAVENOUS ONCE
Status: CANCELLED | OUTPATIENT
Start: 2019-02-20

## 2019-02-20 RX ORDER — METHYLPREDNISOLONE SODIUM SUCCINATE 40 MG/ML
40 INJECTION, POWDER, LYOPHILIZED, FOR SOLUTION INTRAMUSCULAR; INTRAVENOUS ONCE
Status: CANCELLED | OUTPATIENT
Start: 2019-02-20

## 2019-02-20 RX ORDER — METHYLPREDNISOLONE SODIUM SUCCINATE 125 MG/2ML
125 INJECTION, POWDER, LYOPHILIZED, FOR SOLUTION INTRAMUSCULAR; INTRAVENOUS ONCE
Status: CANCELLED | OUTPATIENT
Start: 2019-02-20

## 2019-02-20 NOTE — PROGRESS NOTES
Education Record    Learner:  Patient    Disease / Diagnosis: MS - tysabri    Barriers / Limitations:  None   Comments:    Method:  Brief focused   Comments:    General Topics:  Plan of care reviewed   Comments:    Outcome:  Shows understanding   Comments:

## 2019-03-20 ENCOUNTER — APPOINTMENT (OUTPATIENT)
Dept: HEMATOLOGY/ONCOLOGY | Facility: HOSPITAL | Age: 39
End: 2019-03-20
Attending: Other
Payer: COMMERCIAL

## 2019-05-06 ENCOUNTER — OFFICE VISIT (OUTPATIENT)
Dept: HEMATOLOGY/ONCOLOGY | Facility: HOSPITAL | Age: 39
End: 2019-05-06
Attending: Other
Payer: COMMERCIAL

## 2019-05-06 VITALS
RESPIRATION RATE: 16 BRPM | TEMPERATURE: 99 F | BODY MASS INDEX: 26 KG/M2 | WEIGHT: 158.63 LBS | OXYGEN SATURATION: 99 % | DIASTOLIC BLOOD PRESSURE: 79 MMHG | HEART RATE: 62 BPM | SYSTOLIC BLOOD PRESSURE: 122 MMHG

## 2019-05-06 DIAGNOSIS — G35 MULTIPLE SCLEROSIS (HCC): Primary | ICD-10-CM

## 2019-05-06 PROCEDURE — 96365 THER/PROPH/DIAG IV INF INIT: CPT

## 2019-05-06 RX ORDER — FAMOTIDINE 10 MG/ML
20 INJECTION, SOLUTION INTRAVENOUS ONCE
Status: CANCELLED | OUTPATIENT
Start: 2019-05-13

## 2019-05-06 RX ORDER — METHYLPREDNISOLONE SODIUM SUCCINATE 40 MG/ML
40 INJECTION, POWDER, LYOPHILIZED, FOR SOLUTION INTRAMUSCULAR; INTRAVENOUS ONCE
Status: CANCELLED | OUTPATIENT
Start: 2019-05-13

## 2019-05-06 RX ORDER — MEPERIDINE HYDROCHLORIDE 25 MG/ML
25 INJECTION INTRAMUSCULAR; INTRAVENOUS; SUBCUTANEOUS ONCE
Status: CANCELLED | OUTPATIENT
Start: 2019-05-13

## 2019-05-06 RX ORDER — METHYLPREDNISOLONE SODIUM SUCCINATE 125 MG/2ML
125 INJECTION, POWDER, LYOPHILIZED, FOR SOLUTION INTRAMUSCULAR; INTRAVENOUS ONCE
Status: CANCELLED | OUTPATIENT
Start: 2019-05-13

## 2019-05-06 RX ORDER — DIPHENHYDRAMINE HYDROCHLORIDE 50 MG/ML
25 INJECTION INTRAMUSCULAR; INTRAVENOUS ONCE
Status: CANCELLED | OUTPATIENT
Start: 2019-05-13

## 2019-05-06 NOTE — PROGRESS NOTES
Education Record    Learner:  Patient    Disease / Diagnosis: Multiple Sclerosis    Barriers / Limitations:  None   Comments:    Method:  Brief focused and Reinforcement   Comments:    General Topics:  Plan of care reviewed   Comments:    Outcome:  Shows u

## 2019-05-07 DIAGNOSIS — G35 MULTIPLE SCLEROSIS (HCC): ICD-10-CM

## 2019-05-07 RX ORDER — MODAFINIL 200 MG/1
TABLET ORAL
Qty: 90 TABLET | Refills: 0 | Status: SHIPPED
Start: 2019-05-07 | End: 2019-06-24

## 2019-05-07 NOTE — TELEPHONE ENCOUNTER
Medication: Modafinil 200 mg    Date of last refill: 05/14/18 with 2 addt refills # 90  [place]     Last office visit: 05/14/18  Due back to clinic per last office note:  RTN in 1 year by 05/14/2019  Date next office vis Winnie Georges PA-C  [facility]  5/14/2018, Time completed 1:10 PM     Decision making:  (  ) labs reviewed/ordered - 1  (  ) new diagnosis: - 1  (  ) Images & studies independently reviewed -non F2F  (  ) Case/studies discussed with ot

## 2019-05-21 ENCOUNTER — TELEPHONE (OUTPATIENT)
Dept: NEUROLOGY | Facility: CLINIC | Age: 39
End: 2019-05-21

## 2019-05-21 DIAGNOSIS — G35 MULTIPLE SCLEROSIS (HCC): Primary | ICD-10-CM

## 2019-05-21 NOTE — TELEPHONE ENCOUNTER
Patient is due for her JCV index and follow up appointment. My chart message sent to patient, lab request mailed to home address on file.

## 2019-06-04 ENCOUNTER — OFFICE VISIT (OUTPATIENT)
Dept: HEMATOLOGY/ONCOLOGY | Facility: HOSPITAL | Age: 39
End: 2019-06-04
Attending: Other
Payer: COMMERCIAL

## 2019-06-04 VITALS
TEMPERATURE: 97 F | DIASTOLIC BLOOD PRESSURE: 82 MMHG | HEART RATE: 74 BPM | RESPIRATION RATE: 16 BRPM | SYSTOLIC BLOOD PRESSURE: 127 MMHG | OXYGEN SATURATION: 100 %

## 2019-06-04 DIAGNOSIS — G35 MULTIPLE SCLEROSIS (HCC): Primary | ICD-10-CM

## 2019-06-04 PROCEDURE — 96365 THER/PROPH/DIAG IV INF INIT: CPT

## 2019-06-04 RX ORDER — METHYLPREDNISOLONE SODIUM SUCCINATE 125 MG/2ML
125 INJECTION, POWDER, LYOPHILIZED, FOR SOLUTION INTRAMUSCULAR; INTRAVENOUS ONCE
Status: CANCELLED | OUTPATIENT
Start: 2019-06-04

## 2019-06-04 RX ORDER — FAMOTIDINE 10 MG/ML
20 INJECTION, SOLUTION INTRAVENOUS ONCE
Status: CANCELLED | OUTPATIENT
Start: 2019-06-04

## 2019-06-04 RX ORDER — METHYLPREDNISOLONE SODIUM SUCCINATE 40 MG/ML
40 INJECTION, POWDER, LYOPHILIZED, FOR SOLUTION INTRAMUSCULAR; INTRAVENOUS ONCE
Status: CANCELLED | OUTPATIENT
Start: 2019-06-04

## 2019-06-04 RX ORDER — MEPERIDINE HYDROCHLORIDE 25 MG/ML
25 INJECTION INTRAMUSCULAR; INTRAVENOUS; SUBCUTANEOUS ONCE
Status: CANCELLED | OUTPATIENT
Start: 2019-06-04

## 2019-06-04 RX ORDER — DIPHENHYDRAMINE HYDROCHLORIDE 50 MG/ML
25 INJECTION INTRAMUSCULAR; INTRAVENOUS ONCE
Status: CANCELLED | OUTPATIENT
Start: 2019-06-04

## 2019-06-04 NOTE — PROGRESS NOTES
Education Record    Learner:  Patient    Disease / Diagnosis: tysabri, last of order; patient aware she will get new order sent over for next month. Already authorized by touch until December of this year.      Barriers / Limitations:  None   Comments:    TUTU

## 2019-06-24 ENCOUNTER — TELEPHONE (OUTPATIENT)
Dept: NEUROLOGY | Facility: CLINIC | Age: 39
End: 2019-06-24

## 2019-06-24 ENCOUNTER — OFFICE VISIT (OUTPATIENT)
Dept: NEUROLOGY | Facility: CLINIC | Age: 39
End: 2019-06-24
Payer: COMMERCIAL

## 2019-06-24 VITALS
HEIGHT: 66 IN | DIASTOLIC BLOOD PRESSURE: 72 MMHG | WEIGHT: 157 LBS | BODY MASS INDEX: 25.23 KG/M2 | SYSTOLIC BLOOD PRESSURE: 113 MMHG | HEART RATE: 91 BPM | RESPIRATION RATE: 16 BRPM

## 2019-06-24 DIAGNOSIS — G35 MULTIPLE SCLEROSIS (HCC): Primary | ICD-10-CM

## 2019-06-24 DIAGNOSIS — F32.A ANXIETY AND DEPRESSION: ICD-10-CM

## 2019-06-24 DIAGNOSIS — F41.9 ANXIETY AND DEPRESSION: ICD-10-CM

## 2019-06-24 DIAGNOSIS — R53.83 OTHER FATIGUE: ICD-10-CM

## 2019-06-24 PROCEDURE — 99214 OFFICE O/P EST MOD 30 MIN: CPT | Performed by: PHYSICIAN ASSISTANT

## 2019-06-24 RX ORDER — PYRIDOXINE HCL (VITAMIN B6) 50 MG
TABLET ORAL
COMMUNITY
End: 2021-06-14

## 2019-06-24 RX ORDER — DIAZEPAM 5 MG/1
TABLET ORAL
Qty: 2 TABLET | Refills: 0 | Status: SHIPPED | OUTPATIENT
Start: 2019-06-24 | End: 2021-06-14 | Stop reason: ALTCHOICE

## 2019-06-24 RX ORDER — MODAFINIL 200 MG/1
TABLET ORAL
Qty: 90 TABLET | Refills: 2 | Status: SHIPPED | OUTPATIENT
Start: 2019-06-24 | End: 2020-01-13

## 2019-06-24 NOTE — PROGRESS NOTES
[facility] with [address]  8/7/1980  Primary Care Provider:  Claudine Steiner MD    6/24/2019  Accompanied visit: No      45year old female patient being seen for: Mu Rfl:   •  HYDROcodone-acetaminophen  MG Oral Tab, Take 1 tablet by mouth every 8 (eight) hours. , Disp: , Rfl:   •  natalizumab 300 MG/15ML Intravenous Conc, Tysabri: Dilute one vial (300 mg/15 ml) in 100 ml of 0.9% Sodium Chloride and infuse over 1 h JASON  [facility]  6/24/2019, Time completed 2:36 PM

## 2019-07-01 ENCOUNTER — TELEPHONE (OUTPATIENT)
Dept: NEUROLOGY | Facility: CLINIC | Age: 39
End: 2019-07-01

## 2019-07-01 NOTE — TELEPHONE ENCOUNTER
Received request to add Tysabri orders to [facility] therapy plan. Per Epic review, patient has Touch authorization until 12/2019 and insurance authorization through 11/9/19. #5 infusions authorized.

## 2019-07-03 NOTE — TELEPHONE ENCOUNTER
(late entry) modafinil 200 mg, 90 tablets with 2 refills faxed to [facility], [address] [place]. Fax receipt confirmed.

## 2019-08-22 ENCOUNTER — OFFICE VISIT (OUTPATIENT)
Dept: HEMATOLOGY/ONCOLOGY | Facility: HOSPITAL | Age: 39
End: 2019-08-22
Attending: Other
Payer: COMMERCIAL

## 2019-08-22 VITALS
OXYGEN SATURATION: 97 % | HEART RATE: 92 BPM | DIASTOLIC BLOOD PRESSURE: 77 MMHG | RESPIRATION RATE: 16 BRPM | SYSTOLIC BLOOD PRESSURE: 126 MMHG | TEMPERATURE: 99 F

## 2019-08-22 DIAGNOSIS — G35 MULTIPLE SCLEROSIS (HCC): Primary | ICD-10-CM

## 2019-08-22 PROCEDURE — 96365 THER/PROPH/DIAG IV INF INIT: CPT

## 2019-08-22 NOTE — PROGRESS NOTES
Education Record    Learner:  Patient    Disease / Diagnosis: MS - tysabri     Barriers / Limitations:  None   Comments:    Method:  Brief focused   Comments:    General Topics:  Medication, Side effects and symptom management and Plan of care reviewed   C

## 2019-08-27 ENCOUNTER — TELEPHONE (OUTPATIENT)
Dept: HEMATOLOGY/ONCOLOGY | Facility: HOSPITAL | Age: 39
End: 2019-08-27

## 2019-08-27 NOTE — TELEPHONE ENCOUNTER
Called patient to remind her that her next infusion appointment is technically one day early (27 days from last infusion as opposed to 28 days) and should call her insurance company to make sure that they will cover her infusion for this day.  If they will

## 2019-11-05 ENCOUNTER — TELEPHONE (OUTPATIENT)
Dept: NEUROLOGY | Facility: CLINIC | Age: 39
End: 2019-11-05

## 2019-11-05 DIAGNOSIS — G35 MULTIPLE SCLEROSIS (HCC): Primary | ICD-10-CM

## 2019-11-15 ENCOUNTER — TELEPHONE (OUTPATIENT)
Dept: NEUROLOGY | Facility: CLINIC | Age: 39
End: 2019-11-15

## 2019-11-18 NOTE — TELEPHONE ENCOUNTER
Pt called checking on status of Dr. Adelaide Navarrete approving refill request for pain meds as noted below. Call pt with update.

## 2019-11-18 NOTE — TELEPHONE ENCOUNTER
LM With Dr. Yesica Lee that Dr. Scotty Bird is not going to prescribe this medication per the request of Dr. Yesica Lee.

## 2019-11-19 NOTE — TELEPHONE ENCOUNTER
Informed pt of yesterday's message regarding hydrocodone. She understood and declined the need to speak to RN.

## 2019-11-26 ENCOUNTER — TELEPHONE (OUTPATIENT)
Dept: NEUROLOGY | Facility: CLINIC | Age: 39
End: 2019-11-26

## 2019-11-26 DIAGNOSIS — G35 MULTIPLE SCLEROSIS (HCC): Primary | ICD-10-CM

## 2019-11-26 NOTE — TELEPHONE ENCOUNTER
FDA mandated Tysabri reauthorization questionnaire completed online. Patient is JCV Neg, Index = 0.16. Last test date June 2019, next test date due Dec 2019. Has completed 51 Tysabri infusions with 0 courses of steroids in last 6 months.   Reauthorized to

## 2019-12-03 ENCOUNTER — TELEPHONE (OUTPATIENT)
Dept: NEUROLOGY | Facility: CLINIC | Age: 39
End: 2019-12-03

## 2019-12-03 DIAGNOSIS — G35 MULTIPLE SCLEROSIS (HCC): Primary | ICD-10-CM

## 2019-12-03 NOTE — TELEPHONE ENCOUNTER
RN spoke to [facility] from [facility]    Approved for 11 doses of Tysabri   From  Dec. 6, 2019 - November 6, 2020    Approval number- [id number]

## 2019-12-03 NOTE — TELEPHONE ENCOUNTER
RN faxed Medication order and office notes to [facility]. Fax confirmation received.

## 2019-12-04 ENCOUNTER — APPOINTMENT (OUTPATIENT)
Dept: HEMATOLOGY/ONCOLOGY | Facility: HOSPITAL | Age: 39
End: 2019-12-04
Attending: Other
Payer: COMMERCIAL

## 2019-12-04 VITALS
TEMPERATURE: 97 F | HEART RATE: 105 BPM | SYSTOLIC BLOOD PRESSURE: 119 MMHG | RESPIRATION RATE: 16 BRPM | DIASTOLIC BLOOD PRESSURE: 76 MMHG | OXYGEN SATURATION: 99 %

## 2019-12-04 DIAGNOSIS — G35 MULTIPLE SCLEROSIS (HCC): Primary | ICD-10-CM

## 2019-12-04 PROCEDURE — 96365 THER/PROPH/DIAG IV INF INIT: CPT

## 2019-12-04 NOTE — TELEPHONE ENCOUNTER
Patient may infuse at [facility] for one additional infusion. Effective date - 11/6/2019 - 12/06/2019. Patient will then need to infuse at [facility]. No authorization number provided for services. Cancer center notified.

## 2019-12-04 NOTE — PROGRESS NOTES
Education Record    Learner:  Patient    Disease / Diagnosis:MS    Barriers / Limitations:  None   Comments:    Method:  Discussion   Comments:    General Topics:  Medication and Plan of care reviewed   Comments:    Outcome:  Shows understanding   Comments

## 2019-12-05 ENCOUNTER — APPOINTMENT (OUTPATIENT)
Dept: HEMATOLOGY/ONCOLOGY | Facility: HOSPITAL | Age: 39
End: 2019-12-05
Attending: Other
Payer: COMMERCIAL

## 2019-12-23 ENCOUNTER — TELEPHONE (OUTPATIENT)
Dept: NEUROLOGY | Facility: CLINIC | Age: 39
End: 2019-12-23

## 2019-12-23 NOTE — TELEPHONE ENCOUNTER
Pt called with questions about the change of locations for her infusions. Pt stated she usually goes to [facility]. Call pt to discuss.

## 2019-12-23 NOTE — TELEPHONE ENCOUNTER
[facility] notes received  DOS:  12/19/19    Placed copy in Dr. Abby Onofre bin  Copy to scanning

## 2019-12-24 NOTE — TELEPHONE ENCOUNTER
Pt informed RN she was going to be switching to [place] starting in January 1, 2020. RN informed the pt she needs to provide use with the new insurance. Pt verbalized understanding and would get us the new insurance soon.

## 2020-01-07 ENCOUNTER — APPOINTMENT (OUTPATIENT)
Dept: HEMATOLOGY/ONCOLOGY | Facility: HOSPITAL | Age: 40
End: 2020-01-07
Attending: Other
Payer: COMMERCIAL

## 2020-01-07 ENCOUNTER — DOCUMENTATION ONLY (OUTPATIENT)
Dept: HEMATOLOGY/ONCOLOGY | Facility: HOSPITAL | Age: 40
End: 2020-01-07

## 2020-01-07 NOTE — PROGRESS NOTES
Per Matthew Ramírez in billing- [facility] Sample is needed if changed from [place]. May not be able to infuse at [facility]  Reached out to nurses at [facility]:  ANA Reynaga, [address] [place] [address],     I just checked [place] Touch and she is authorized at [facility] sure

## 2020-01-13 DIAGNOSIS — G35 MULTIPLE SCLEROSIS (HCC): ICD-10-CM

## 2020-01-13 RX ORDER — MODAFINIL 200 MG/1
TABLET ORAL
Qty: 90 TABLET | Refills: 1 | Status: SHIPPED | OUTPATIENT
Start: 2020-01-13 | End: 2021-06-14

## 2020-01-13 NOTE — TELEPHONE ENCOUNTER
Medication: Modafinil 200 mg    Date of last refill: 06/24/2019 with 2 addt refills      Last office visit: 06/24/2019  Due back to clinic per last office note:  RTN in 1 year by 06/24/2020  Date next office visit scheduled:  No future appointments.     Jolie Hind

## 2020-01-30 ENCOUNTER — TELEPHONE (OUTPATIENT)
Dept: NEUROLOGY | Facility: CLINIC | Age: 40
End: 2020-01-30

## 2020-04-15 ENCOUNTER — TELEPHONE (OUTPATIENT)
Dept: NEUROLOGY | Facility: CLINIC | Age: 40
End: 2020-04-15

## 2020-04-15 DIAGNOSIS — G35 MULTIPLE SCLEROSIS (HCC): Primary | ICD-10-CM

## 2020-04-15 NOTE — TELEPHONE ENCOUNTER
Pt called and informed the RN that she has not had her Tysabri infusion since Dec. 2019. Pt's insurance has changed twice and now is going to upload her new insurance card to my chart. Pt is going to  the [address] lab form tomorrow.       Pt is conc

## 2020-04-22 ENCOUNTER — TELEPHONE (OUTPATIENT)
Dept: NEUROLOGY | Facility: CLINIC | Age: 40
End: 2020-04-22

## 2020-04-22 NOTE — TELEPHONE ENCOUNTER
Spoke to patient and she has changed insurances twice now which has made it difficult to get her tysabri infusions. She is interested in restarting the tysabri. She would like to do her infusions at [facility] if possible.  Explained that she would need

## 2020-05-27 ENCOUNTER — TELEPHONE (OUTPATIENT)
Dept: NEUROLOGY | Facility: CLINIC | Age: 40
End: 2020-05-27

## 2020-05-27 NOTE — TELEPHONE ENCOUNTER
Insurance information per Epic:  Member ID: [id number] 08/07/1980 - F     Group ID: Pamela Velasquez [id number] [facility]     Group Name: [address] [place] [address]     Insurance cards uploaded in [facility] per [place] [facility].     Will need to

## 2020-05-27 NOTE — TELEPHONE ENCOUNTER
FDA mandated Tysabri reauthorization questionnaire completed online. Patient is JCV Negative, Index = 0.13. Last test date 4/27/2020, next test date due 10/2020. Has completed 52 Tysabri infusions with 0 courses of steroids in last 6 months.   Reauthorize

## 2020-06-03 ENCOUNTER — TELEPHONE (OUTPATIENT)
Dept: NEUROLOGY | Facility: CLINIC | Age: 40
End: 2020-06-03

## 2020-06-03 NOTE — TELEPHONE ENCOUNTER
Tysabri infusion record received from [facility] infusion center for physician review. No signs/symptoms of infusion reaction noted. Patient infused on 6/2/2020. No premedications given. Lot Number [id number], Exp 11/2022  No new orders requested.   Orders active

## 2020-07-15 ENCOUNTER — TELEPHONE (OUTPATIENT)
Dept: NEUROLOGY | Facility: CLINIC | Age: 40
End: 2020-07-15

## 2020-07-15 NOTE — TELEPHONE ENCOUNTER
Tysabri infusion record received from [facility] infusion center for physician review. No signs/symptoms of infusion reaction noted. Patient infused on 7/7/2020  Lot Number [id number], Exp 12/2022 x1  No orders requested.   Orders active for 10 additional infusion

## 2020-08-04 NOTE — TELEPHONE ENCOUNTER
Patient requesting a letter allowing her to leave [facility] without completing the 1 hour observation period. Letter pended for provider approval.  Letter to be faxed to [facility] when completed.

## 2020-08-04 NOTE — TELEPHONE ENCOUNTER
Patient called and would like a letter stating she can leave [facility] infusion center right after her IVIG infusion.

## 2020-08-06 ENCOUNTER — TELEPHONE (OUTPATIENT)
Dept: NEUROLOGY | Facility: CLINIC | Age: 40
End: 2020-08-06

## 2020-08-06 NOTE — TELEPHONE ENCOUNTER
Tysabri infusion record received from [facility] infusion center for physician review. No signs/symptoms of infusion reaction noted. Patient infused on 8/5/2020  Lot Number [id number], Exp 04/2023 x1  No new orders requested.   Orders active for 9 additional infus

## 2020-09-22 ENCOUNTER — TELEPHONE (OUTPATIENT)
Dept: NEUROLOGY | Facility: CLINIC | Age: 40
End: 2020-09-22

## 2020-09-22 NOTE — TELEPHONE ENCOUNTER
Tysabri infusion record received from [facility] for physician review. No signs/symptoms of infusion reaction noted. Patient infused on 9/21/2020  Patient required to stay for the 1 hour observation period.  No reactions noted  Lot Number [id number]

## 2020-10-20 ENCOUNTER — TELEPHONE (OUTPATIENT)
Dept: NEUROLOGY | Facility: CLINIC | Age: 40
End: 2020-10-20

## 2020-10-20 NOTE — TELEPHONE ENCOUNTER
Tysabri infusion record received from [facility] infusion center for physician review. No signs/symptoms of infusion reaction noted. Patient infused on 10/19/2020  Lot Number [id number] x 1, Exp 09/2023  Patient without reactions during the observation period.   Juan Diego Hdez

## 2020-11-17 ENCOUNTER — TELEPHONE (OUTPATIENT)
Dept: NEUROLOGY | Facility: CLINIC | Age: 40
End: 2020-11-17

## 2020-11-17 NOTE — TELEPHONE ENCOUNTER
Tysabri infusion record received from [facility] infusion [place] for physician review. No Signs/symptoms of infusion reaction noted.   Patient infused on 11/16/2020  Stable during the 1 hour observation period  Lot Number [id number] x 1, Exp 07/2022  No orders requ

## 2020-11-25 ENCOUNTER — TELEPHONE (OUTPATIENT)
Dept: NEUROLOGY | Facility: CLINIC | Age: 40
End: 2020-11-25

## 2020-11-25 DIAGNOSIS — G35 MULTIPLE SCLEROSIS (HCC): Primary | ICD-10-CM

## 2020-11-25 NOTE — TELEPHONE ENCOUNTER
FDA mandated Tysabri reauthorization questionnaire completed online. Patient is JCV negative, Index = 0.13. Last test date 4/27/2020, next test date due now. Has completed 62 Tysabri infusions with 1 courses of steroids in last 6 months.   Reauthorized to

## 2020-12-09 ENCOUNTER — TELEPHONE (OUTPATIENT)
Dept: NEUROLOGY | Facility: CLINIC | Age: 40
End: 2020-12-09

## 2020-12-09 DIAGNOSIS — G35 MULTIPLE SCLEROSIS (HCC): ICD-10-CM

## 2020-12-09 NOTE — TELEPHONE ENCOUNTER
Patient scheduled for infusion of Tysabri on Monday at [facility]. Calling to request new orders for continued infusions. Current orders are . Will send new orders. Orders to be faxed to [facility] location at [phone].     Orders faxed with

## 2020-12-15 ENCOUNTER — TELEPHONE (OUTPATIENT)
Dept: NEUROLOGY | Facility: CLINIC | Age: 40
End: 2020-12-15

## 2020-12-15 NOTE — TELEPHONE ENCOUNTER
Tysbri infusion record received from [facility] for physician review. No signs/symptoms of infusion reaction noted. Patient infused on 12/14/2020  Patient with no reactions during the 1 hour post infusion observation period.   Lot Number [id number]

## 2021-01-12 ENCOUNTER — TELEPHONE (OUTPATIENT)
Dept: NEUROLOGY | Facility: CLINIC | Age: 41
End: 2021-01-12

## 2021-01-12 NOTE — TELEPHONE ENCOUNTER
Tysabri infusion record received from [facility] infusion center for physician review. No signs/symptoms of infusion reaction noted. Patient infused on 1/11/2021  Stable during the 1 hour observation period.   Lot Number [id number], Exp 10/2023 x 1  No orders requ

## 2021-02-09 ENCOUNTER — TELEPHONE (OUTPATIENT)
Dept: NEUROLOGY | Facility: CLINIC | Age: 41
End: 2021-02-09

## 2021-02-09 NOTE — TELEPHONE ENCOUNTER
Received results of patient JCV index. Collected 01/11/2021    JCV Negative, Index 0.19    Will send to scanning.

## 2021-02-09 NOTE — TELEPHONE ENCOUNTER
Tysabri infusion record received from [facility] for physician review. No signs/symptoms of infusion reaction noted. Patient infused on 28/2021  Patient stable without reactions during the post infusion observation period.   Lot Number [id number]

## 2021-03-09 ENCOUNTER — TELEPHONE (OUTPATIENT)
Dept: NEUROLOGY | Facility: CLINIC | Age: 41
End: 2021-03-09

## 2021-03-09 NOTE — TELEPHONE ENCOUNTER
Tysabri infusion record received from [facility] infusion [place] for physician review. No signs/symptoms of infusion reaction noted. Patient infused on 3/8/2021. Patient stable during the observation period post infusion.   Lot Number [id number] x1, Exp 10/2023

## 2021-04-01 ENCOUNTER — TELEPHONE (OUTPATIENT)
Dept: NEUROLOGY | Facility: CLINIC | Age: 41
End: 2021-04-01

## 2021-04-01 NOTE — TELEPHONE ENCOUNTER
Patient contacted our office regarding getting the COVID-19 vaccine.   Patient was informed that the [facility] providers are all in favor of everyone getting the vaccine, provided they meet the CDC guidelines for receiving it (visit CDC website f

## 2021-05-04 ENCOUNTER — TELEPHONE (OUTPATIENT)
Dept: NEUROLOGY | Facility: CLINIC | Age: 41
End: 2021-05-04

## 2021-05-04 NOTE — TELEPHONE ENCOUNTER
Call placed to [facility] infusion center to discuss patients upcoming infusion. Patient last infused in March and no showed for April infusion. Calling to verify if ok to infuse. Patient will infuse this week. Savanah Stephen with provider.

## 2021-05-04 NOTE — TELEPHONE ENCOUNTER
Per Malissa Adam from [email or web address], pt's last tysabri infusion was in March. She no showed for April and did not return their calls. Pt now wants to come in on this Thursday, May 6th.   [facility] needs provider's approval to infuse since it has been two months [facility]

## 2021-05-06 ENCOUNTER — TELEPHONE (OUTPATIENT)
Dept: NEUROLOGY | Facility: CLINIC | Age: 41
End: 2021-05-06

## 2021-05-06 NOTE — TELEPHONE ENCOUNTER
Tysabri infusion record received from [facility] for physician review. No signs/symptoms of infusion reaction noted. Patient infused on 5/6/2021  Patient stable during the post infusion observation period.   Lot Number [id number] x 1, Exp 10/2023

## 2021-05-06 NOTE — TELEPHONE ENCOUNTER
Patient is due to have [employer] reauthorization completed by 6/3/2021. [facility] center will need new office notes to complete medication insurance authorization.

## 2021-05-06 NOTE — TELEPHONE ENCOUNTER
Pt cancelled her OV with [facility] today, due to no transportation. Advised pt she needs to be seen to continue her Tysabri infusions. Pt said she would CB to r/s.

## 2021-05-06 NOTE — TELEPHONE ENCOUNTER
Pt's next infusion is scheduled for June 7th. It is not authorized and they cannot obtain authorization without current clinical notes. I will contact pt to advise.

## 2021-05-06 NOTE — TELEPHONE ENCOUNTER
Informed pt we were contacted by [facility]. They need current clinical notes to obtain authorization for her June 7th infusion. Pt stated she was told it was authorized. I explained that was for today but not June.   Pt was last seen in June of 2019

## 2021-05-21 ENCOUNTER — TELEPHONE (OUTPATIENT)
Dept: NEUROLOGY | Facility: CLINIC | Age: 41
End: 2021-05-21

## 2021-05-21 NOTE — TELEPHONE ENCOUNTER
RN received a call from Rachna at [facility] requesting her latest office notes. RN informed Rachna the patient had to cancel her appt schedule for May 6th due to lack of transportation.      Rachna informed RN she would reach out to Jesus to inform her she

## 2021-06-10 NOTE — TELEPHONE ENCOUNTER
Pt made f/up appt for this coming Monday, June 14th, with Dr. Ernestine Perea. I explained the appt is necessary if she wants to continue with the tysabri infusions.

## 2021-06-10 NOTE — TELEPHONE ENCOUNTER
Siria Tony from [facility] verifying if patient has been seen in office. Pt is overdue for tysabri infusion due to inability to reauthorize her treatment without a recent OV. Pt has not called back to reschedule the 5/6/21 OV she cancelled.   Advised I will contac

## 2021-06-10 NOTE — TELEPHONE ENCOUNTER
LMTCB to schedule OV with Dr. Jacqueline Paige per notes below. Holding opening on this Monday, 6/14/21, to offer to patient.

## 2021-06-14 ENCOUNTER — OFFICE VISIT (OUTPATIENT)
Dept: NEUROLOGY | Facility: CLINIC | Age: 41
End: 2021-06-14
Payer: COMMERCIAL

## 2021-06-14 VITALS
HEIGHT: 66 IN | WEIGHT: 157 LBS | DIASTOLIC BLOOD PRESSURE: 70 MMHG | RESPIRATION RATE: 16 BRPM | HEART RATE: 74 BPM | SYSTOLIC BLOOD PRESSURE: 120 MMHG | BODY MASS INDEX: 25.23 KG/M2

## 2021-06-14 DIAGNOSIS — F32.A ANXIETY AND DEPRESSION: ICD-10-CM

## 2021-06-14 DIAGNOSIS — G35 MULTIPLE SCLEROSIS (HCC): Primary | ICD-10-CM

## 2021-06-14 DIAGNOSIS — R53.83 OTHER FATIGUE: ICD-10-CM

## 2021-06-14 DIAGNOSIS — F41.9 ANXIETY AND DEPRESSION: ICD-10-CM

## 2021-06-14 DIAGNOSIS — G89.29 OTHER CHRONIC PAIN: ICD-10-CM

## 2021-06-14 PROCEDURE — 3074F SYST BP LT 130 MM HG: CPT | Performed by: OTHER

## 2021-06-14 PROCEDURE — 99214 OFFICE O/P EST MOD 30 MIN: CPT | Performed by: OTHER

## 2021-06-14 PROCEDURE — 3008F BODY MASS INDEX DOCD: CPT | Performed by: OTHER

## 2021-06-14 PROCEDURE — 3078F DIAST BP <80 MM HG: CPT | Performed by: OTHER

## 2021-06-14 RX ORDER — MODAFINIL 200 MG/1
200 TABLET ORAL EVERY MORNING
Qty: 90 TABLET | Refills: 1 | Status: SHIPPED | OUTPATIENT
Start: 2021-06-14 | End: 2021-06-17

## 2021-06-14 NOTE — PROGRESS NOTES
[facility] with [address]  8/7/1980  Primary Care Provider:  No primary care provider on file.     6/14/2021  Accompanied visit:      (x) No.      36year old yo patient callum Location: Left arm, Patient Position: Sitting, Cuff Size: adult)   Pulse 74   Resp 16   Ht 66\"   Wt 157 lb (71.2 kg)   BMI 25.34 kg/m²   Looks stated age  General Exam:  HENT:  pink conjunctiva anicteric sclerae  Neck no adenopathy, thyroid normal  Heart condition and or test results, follow up will be readjusted      Angeles Noe MD  Vascular & General Neurology  Director, Multiple Sclerosis Program  [facility]  6/14/2021, Time completed 10:55 AM    Decision making:  ( x ) labs revi

## 2021-06-16 ENCOUNTER — TELEPHONE (OUTPATIENT)
Dept: NEUROLOGY | Facility: CLINIC | Age: 41
End: 2021-06-16

## 2021-06-16 DIAGNOSIS — G35 MULTIPLE SCLEROSIS (HCC): Primary | ICD-10-CM

## 2021-06-16 NOTE — TELEPHONE ENCOUNTER
Spoke with the patient and I compared the MRI visually and although there was one new lesion, the overall burden is still not that bad.   Specially when compared to 2016 MRI

## 2021-06-17 RX ORDER — MODAFINIL 200 MG/1
200 TABLET ORAL EVERY MORNING
Qty: 90 TABLET | Refills: 1 | Status: SHIPPED | OUTPATIENT
Start: 2021-06-17

## 2021-06-17 NOTE — TELEPHONE ENCOUNTER
Patient informed and is agreeable to try using coupon, requesting script be forwarded to [facility] (confirmed pharmacy). Script pended for MD to approve.

## 2021-06-17 NOTE — TELEPHONE ENCOUNTER
Received denial for modafinil, noting that modafinil is covered for narcolepsy types 1 and 2, obstructive sleep apnea-hyponea, or shift work disorder. Patient may be able to procure medication using online based coupon such as good-rx.     LMTCB to updat

## 2021-08-04 ENCOUNTER — TELEPHONE (OUTPATIENT)
Dept: NEUROLOGY | Facility: CLINIC | Age: 41
End: 2021-08-04

## 2021-08-04 DIAGNOSIS — G35 MULTIPLE SCLEROSIS (HCC): ICD-10-CM

## 2021-08-04 NOTE — TELEPHONE ENCOUNTER
Received call from [facility] requesting continued Tysabri orders. Patient called [facility] to schedule next infusion. Orders entered in Epic and faxed to [facility] infusion center with receipt confirmation. [facility] will call and schedule patient.

## 2021-08-11 ENCOUNTER — TELEPHONE (OUTPATIENT)
Dept: NEUROLOGY | Facility: CLINIC | Age: 41
End: 2021-08-11

## 2021-08-11 NOTE — TELEPHONE ENCOUNTER
Tysabri infusion record received from [facility] infusion [place] for physician review. No signs/symptoms of infusion reaction noted. Patient infused on 8/9/2021. Stable during the post infusion observation period.   Lot Number [id number] x1, Exp 07/2024  No new or

## 2021-09-16 ENCOUNTER — TELEPHONE (OUTPATIENT)
Dept: NEUROLOGY | Facility: CLINIC | Age: 41
End: 2021-09-16

## 2021-09-16 NOTE — TELEPHONE ENCOUNTER
Tysabri infusion record received from [facility] infusion center [facility] for physician review. No signs/symptoms of infusion reaction noted. Patient infused on 9/15/2021. Stable during the 1 hour post infusion observation period.   Lot Number [id number] x 1, E

## 2021-10-20 ENCOUNTER — TELEPHONE (OUTPATIENT)
Dept: NEUROLOGY | Facility: CLINIC | Age: 41
End: 2021-10-20

## 2021-10-20 NOTE — TELEPHONE ENCOUNTER
Received courtesy call from Qasim with [facility]. Stated patient is supposed to be coming in monthly, advised that she is notorious for cancelling/moving her appointments. She is scheduled for Monday but that is at the 5 week emmy.      Nothing further

## 2021-11-02 ENCOUNTER — TELEPHONE (OUTPATIENT)
Dept: NEUROLOGY | Facility: CLINIC | Age: 41
End: 2021-11-02

## 2021-11-02 NOTE — TELEPHONE ENCOUNTER
Tysabri infusion record received from [facility] infusion center for physician review. No signs/symptoms of infusion reaction noted. Patient infused on 10/25/2021. This infusion was 5 weeks post previous infusion.  Patient stable without reaction during the po

## 2021-11-23 ENCOUNTER — TELEPHONE (OUTPATIENT)
Dept: NEUROLOGY | Facility: CLINIC | Age: 41
End: 2021-11-23

## 2021-11-23 DIAGNOSIS — G35 MULTIPLE SCLEROSIS (HCC): Primary | ICD-10-CM

## 2021-11-30 ENCOUNTER — TELEPHONE (OUTPATIENT)
Dept: NEUROLOGY | Facility: CLINIC | Age: 41
End: 2021-11-30

## 2021-11-30 NOTE — TELEPHONE ENCOUNTER
Tysabri infusion record received from [facility] infusion center in [facility] for physician review. No signs/symptoms of infusion reaction noted. Patient infused on 11/30/2021. Stable during the post infusion observation period.   Lot Number [id number] x1, Exp 0

## 2022-01-06 ENCOUNTER — TELEPHONE (OUTPATIENT)
Dept: NEUROLOGY | Facility: CLINIC | Age: 42
End: 2022-01-06

## 2022-01-06 NOTE — TELEPHONE ENCOUNTER
Tysabri infusion record received from [facility] for physician review. No signs/symptoms of infusion reaction noted. Patient infused on 1/5/2022. Stable during the post infusion observation period.   Lot Number [id number] x1, Exp 08/2024  No new or

## 2022-02-09 ENCOUNTER — TELEPHONE (OUTPATIENT)
Dept: NEUROLOGY | Facility: CLINIC | Age: 42
End: 2022-02-09

## 2022-02-09 NOTE — TELEPHONE ENCOUNTER
Pt currently getting an infusion at [facility] and she is calling to get a doctor note saying she is able to leave after an hour. Please call to advise.

## 2022-02-09 NOTE — TELEPHONE ENCOUNTER
[facility] infusion center calling for an update to infusion orders. New packet insert states patient does not have to stay for the one hour observation period if order received from provider office. Patient requesting to have order placed in chart at infusion center. Verbal order given to nurse Chaparrita, will fax new orders to infusion center. New order entered in [employer] and faxed to [phone] with receipt confirmation.

## 2022-02-10 NOTE — TELEPHONE ENCOUNTER
Tysabri infusion record received from [facility] center in [facility] for physician review. No signs/symptoms of infusion reaction noted. Patient infused on 2/9/2022. Not required to stay for the post infusion observation period. Lot Number [id number] x1, Exp 09/2025  No new orders requested. Orders active for 6 additional infusions. Next infusion scheduled for 3/9/2022. Patient infuses every 4 weeks. Placed in physician's box for review.

## 2022-02-16 ENCOUNTER — TELEPHONE (OUTPATIENT)
Dept: NEUROLOGY | Facility: CLINIC | Age: 42
End: 2022-02-16

## 2022-02-16 NOTE — TELEPHONE ENCOUNTER
Results of recent JCV index drawn at [facility] infusion center in [facility] received for provider review. 2/9/2022 = 0.27 IND    Copy to scanning.

## 2022-03-10 ENCOUNTER — TELEPHONE (OUTPATIENT)
Dept: NEUROLOGY | Facility: CLINIC | Age: 42
End: 2022-03-10

## 2022-03-10 NOTE — TELEPHONE ENCOUNTER
Tysabri infusion record received from [place] [address] for physician review. No signs/symptoms of infusion reaction noted. Patient infused on 3/9/2022. No observation period required. Lot Number [id number] x1, Exp 09/2025  No new orders requested. Orders active for 11 additional infusions. Next infusion scheduled for 4/7/2022. Patient infuses every 4 weeks. Placed in physician's box for review.

## 2022-04-01 ENCOUNTER — OFFICE VISIT (OUTPATIENT)
Dept: OCCUPATIONAL MEDICINE | Age: 42
End: 2022-04-01
Attending: PHYSICIAN ASSISTANT

## 2022-04-07 ENCOUNTER — TELEPHONE (OUTPATIENT)
Dept: NEUROLOGY | Facility: CLINIC | Age: 42
End: 2022-04-07

## 2022-04-07 NOTE — TELEPHONE ENCOUNTER
Tysabri infusion record received from [facility] infusion [place] for physician review. No signs/symptoms of infusion reaction noted. Patient infused on 4/7/2022. No longer staying for post infusion observation period. Lot Number [id number] x1, Exp 09/2025  No orders requested. Orders active for 10 additional infusions. Next infusion scheduled for 5/5/2022. Placed in physician's box for review.

## 2022-05-05 ENCOUNTER — TELEPHONE (OUTPATIENT)
Dept: NEUROLOGY | Facility: CLINIC | Age: 42
End: 2022-05-05

## 2022-05-05 NOTE — TELEPHONE ENCOUNTER
Tysabri infusion record received from [facility] for physician review. No signs/symptoms of infusion reaction noted. Patient infused on 5/5/2022. No longer required to stay for the post infusion observation period. No premedications given prior to infusion. Lot Number [id number] x1, Exp 10/2025  No new orders requested. Orders active for 9 additional infusions. Next infusion scheduled for 6/2/2022. Patient infuses every 4 weeks. Placed in physician's box for review.

## 2022-05-24 ENCOUNTER — TELEPHONE (OUTPATIENT)
Dept: NEUROLOGY | Facility: CLINIC | Age: 42
End: 2022-05-24

## 2022-05-24 NOTE — TELEPHONE ENCOUNTER
FDA mandated Tysabri reauthorization questionnaire completed online. Patient is JCV negative, Index = 0.27. Last test date Feb 2022, next test date due Aug 2022. Has completed 72 Tysabri infusions with 0 courses of steroids in last 6 months. Reauthorized to continue receiving infusion; this is not a prior authorization for medication. Patient receives infusions at [facility] infusion center. Re-authorized from 6/5/2022 - 12/4/2022 through the [employer] program.    Tysabri authorized through insurance. [facility] will maintain the insurance authorization. Patient requires an office visit for follow up clinical notes for this years authorization. Message has been sent to patient. Tysabri is buy and bill. Patient is currently enrolled in the co-pay assistance programs for medication and infusions. Information discussed with Dr. Pamela Kaufman prior to submission. Orders active at infusion center. Patient is no longer required to stay for the post infusion observation period. complains of pain/discomfort

## 2022-06-07 ENCOUNTER — TELEPHONE (OUTPATIENT)
Dept: NEUROLOGY | Facility: CLINIC | Age: 42
End: 2022-06-07

## 2022-06-07 NOTE — TELEPHONE ENCOUNTER
Tysabri infusion record received from [facility] for physician review. No signs/symptoms of infusion reaction noted. Patient infused on 6/3/2022. Patient no longer required to stay for the post infusion observation. Lot Number [id number] x1, Exp 10/2025  No new orders requested. Orders active for 8 additional infusions. Next infusion scheduled for 7/7/2022. Patient infuses every 4 weeks. Placed in physician's box for review. Patient is due for follow up visit with provider.

## 2022-07-07 ENCOUNTER — TELEPHONE (OUTPATIENT)
Dept: NEUROLOGY | Facility: CLINIC | Age: 42
End: 2022-07-07

## 2022-07-07 NOTE — TELEPHONE ENCOUNTER
Tysabri infusion record received from [facility] for physician review. No signs/symptoms of infusion reaction noted. Patient infused on 7/7/2022. Patient no longer required to stay for the post infusion observation period. Lot Number [id number] x1, Exp 11/2025  No new orders requested. Orders active for 7 additional infusions. Next infusion scheduled for 8/4/2022. Patient infuses every 4 weeks. Placed in physician's box for review. Patient scheduled follow up visit with provider on 8/17/2022. Will need to fax clinical notes to infusion center to obtain continued authorization for medication.

## 2022-08-04 ENCOUNTER — TELEPHONE (OUTPATIENT)
Dept: NEUROLOGY | Facility: CLINIC | Age: 42
End: 2022-08-04

## 2022-08-04 NOTE — TELEPHONE ENCOUNTER
Tysabri infusion record received from [facility] for physician review. No signs/symptoms of infusion reaction noted. Patient infused on 8/4/2022. No longer required to stay for the post infusion observation period. Lot Number [id number] x1, Exp 12/2025  No new orders requested. Orders active for 6 additional infusions. Next infusion scheduled for 9/1/2022. Patient infuses every 4 weeks. Placed in physician's box for review. Patient give free interim dose of Tysabri.

## 2022-08-11 ENCOUNTER — OFFICE VISIT (OUTPATIENT)
Dept: NEUROLOGY | Facility: CLINIC | Age: 42
End: 2022-08-11
Payer: COMMERCIAL

## 2022-08-11 VITALS
HEART RATE: 85 BPM | WEIGHT: 160 LBS | BODY MASS INDEX: 25.71 KG/M2 | SYSTOLIC BLOOD PRESSURE: 106 MMHG | HEIGHT: 66 IN | DIASTOLIC BLOOD PRESSURE: 58 MMHG | RESPIRATION RATE: 16 BRPM

## 2022-08-11 DIAGNOSIS — G35 MS (MULTIPLE SCLEROSIS) (HCC): Primary | ICD-10-CM

## 2022-08-11 PROCEDURE — 3078F DIAST BP <80 MM HG: CPT | Performed by: PHYSICIAN ASSISTANT

## 2022-08-11 PROCEDURE — 3008F BODY MASS INDEX DOCD: CPT | Performed by: PHYSICIAN ASSISTANT

## 2022-08-11 PROCEDURE — 3074F SYST BP LT 130 MM HG: CPT | Performed by: PHYSICIAN ASSISTANT

## 2022-08-11 PROCEDURE — 99214 OFFICE O/P EST MOD 30 MIN: CPT | Performed by: PHYSICIAN ASSISTANT

## 2022-09-01 ENCOUNTER — TELEPHONE (OUTPATIENT)
Dept: NEUROLOGY | Facility: CLINIC | Age: 42
End: 2022-09-01

## 2022-09-01 NOTE — TELEPHONE ENCOUNTER
Tysabri infusion record received from [facility] for physician review. No signs/symptoms of infusion reaction noted. Patient infused on 9/1/2022. No longer required to stay for the post infusion observation period. Lot Number [id number] x1, Exp 12/2025  No new orders requested. Orders active for 5 additional infusions. Next infusion scheduled for 9/29/2022. Placed in physician's box for review. Patient had JCV index drawn at infusion appointment. Lab results pending.

## 2022-09-13 ENCOUNTER — TELEPHONE (OUTPATIENT)
Dept: NEUROLOGY | Facility: CLINIC | Age: 42
End: 2022-09-13

## 2022-09-13 NOTE — TELEPHONE ENCOUNTER
Patient had JCV index drawn at [place] [id number]. Results received. JCV = IND: 0.25    Results sent to scan.

## 2022-10-18 ENCOUNTER — TELEPHONE (OUTPATIENT)
Dept: NEUROLOGY | Facility: CLINIC | Age: 42
End: 2022-10-18

## 2022-10-18 NOTE — TELEPHONE ENCOUNTER
Tysabri infusion record received from [facility] infusion center in [facility] for physician review. No signs/syptoms of infusion reaction noted. Patient infused on 10/14/2022. No observation period required for patient. Lot Number [id number], Exp 12/2025  No new orders requested. Orders active for 4 additional infusions. Next infusion scheduled for 11/11/2022. Patient infuses every 4 weeks. Placed in physician's box for review.

## 2022-11-15 ENCOUNTER — TELEPHONE (OUTPATIENT)
Dept: NEUROLOGY | Facility: CLINIC | Age: 42
End: 2022-11-15

## 2022-11-17 NOTE — TELEPHONE ENCOUNTER
Left voice-mail message for patient to call back. MRI Brain is stable. No lesions seen in the thoracic cord.

## 2022-11-23 ENCOUNTER — TELEPHONE (OUTPATIENT)
Dept: NEUROLOGY | Facility: CLINIC | Age: 42
End: 2022-11-23

## 2022-11-23 NOTE — TELEPHONE ENCOUNTER
Tysabri infusion record received from [facility] for physician review. No signs/symptoms of infusion reaction noted. Patient infused on 11/11/2022. No longer required to stay for the post infusion observation period. Lot Number [id number] x1, Exp 12/2025  No new orders requested. Orders active for 2 additional infusions. Next infusion scheduled for 12/9/2022. Infusing every 4 weeks. Placed in physician's box for review.

## 2022-11-29 ENCOUNTER — TELEPHONE (OUTPATIENT)
Dept: NEUROLOGY | Facility: CLINIC | Age: 42
End: 2022-11-29

## 2022-11-29 NOTE — TELEPHONE ENCOUNTER
FDA mandated Tysabri reauthorization questionnaire completed online. Patient is JCV IND, Index = 0.25. Last test date Sept 2022, next test date due March 2023. Has completed 66 Tysabri infusions with 0 courses of steroids in last 6 months. Reauthorized to continue receiving infusion; this is not a prior authorization for medication. Patient receives infusions at [facility]. Re-authorized from 12/05/2022 - 6/5/2023 through the [employer] program.    Tysabri authorized through insurance. Infusion center will obtain and maintain insurance authorization. Tysabri is buy and bill. Patient enrolled in the Financial copay assistance programs for medication and infusion services. Information discussed with Dr. Brent Oliveira prior to submission. Patient infuses every 4 weeks.

## 2022-12-15 ENCOUNTER — TELEPHONE (OUTPATIENT)
Dept: NEUROLOGY | Facility: CLINIC | Age: 42
End: 2022-12-15

## 2022-12-15 NOTE — TELEPHONE ENCOUNTER
Tysabri infusion record received from [facility] infusion [facility] for physician review. No signs/symptoms of infusion reaction noted. Patient infused on 12/14/2022. No longer required to stay for the post infusion observation period. Lot Number [id number] x1, Exp 01/2026  No new orders requested. Orders active for 1 additional infusions. Next infusion scheduled for 1/11/2023. Patient infuses every 4 weeks. Placed in physician's box for review. JCV is up to date.

## 2023-01-11 ENCOUNTER — TELEPHONE (OUTPATIENT)
Dept: NEUROLOGY | Facility: CLINIC | Age: 43
End: 2023-01-11

## 2023-01-11 NOTE — TELEPHONE ENCOUNTER
Tysabri infusion record received from [facility] for physician review. No signs/symptoms of infusion reaction noted. Patient infused on 01/11/2023. No longer required to stay for the post infusion observation period. Lot Number [id number] x1, Exp 12/2025  New orders requested. Will fax new set of monthly orders for one year of continuous infusions monthly. Next infusion scheduled for 02/08/2023. Patient infusing every 4 weeks. Placed in physician's box for review. New orders faxed with receipt confirmation.

## 2023-03-28 ENCOUNTER — TELEPHONE (OUTPATIENT)
Dept: NEUROLOGY | Facility: CLINIC | Age: 43
End: 2023-03-28

## 2023-03-28 NOTE — TELEPHONE ENCOUNTER
Tysabri infusion record received from [facility] for physician review. No signs/symptoms of infusion reaction noted. Patient infused on 3/23/2023. No longer required to stay for the post infusion observation period. Lot Number [id number] x1, Exp 01/2026  No new orders requested. Orders active for 11 additional infusions. Next infusion scheduled for 4/25/20213, patient infuses every 4 weeks. Placed in physician's box for review. JCV index was drawn but specimen hemolyzed. They will draw a new specimen at her next infusion in April.

## 2023-05-04 ENCOUNTER — TELEPHONE (OUTPATIENT)
Dept: NEUROLOGY | Facility: CLINIC | Age: 43
End: 2023-05-04

## 2023-05-04 NOTE — TELEPHONE ENCOUNTER
Tysabri infusion record received from [facility] for physician review. No signs/symptoms of infusion reaction noted. Patient infused on 5/4/2023. No longer required to stay for the post infusion observation period. Lot Number [id number] x 1, Exp 01/2026  No new orders requested. Orders active for 10 additional infusions. Next infusion scheduled for 6/6/2023. Patient infuses every 4 weeks. Placed in physician's box for review. JCV index drawn today during infusion. Results pending.

## 2023-05-11 LAB — INDEX VALUE: 0.24

## 2023-05-16 ENCOUNTER — TELEPHONE (OUTPATIENT)
Dept: NEUROLOGY | Facility: CLINIC | Age: 43
End: 2023-05-16

## 2023-05-16 NOTE — TELEPHONE ENCOUNTER
FDA mandated Tysabri reauthorization questionnaire completed online. Patient is JCV IND, Index = 0.24. Last test date May 2023, next test date due Nov 2023. Has completed 80 Tysabri infusions with 0 courses of steroids in last 6 months. Reauthorized to continue receiving infusion; this is not a prior authorization for medication. Patient receives infusions at [facility] [place]. Re-authorized from 6/6/2023 - 12/5/2023 Through the MS touch program.    Tysabri authorized through her insurance. Infusion center will maintain the authorization for infusions. Tysabri is buy and bill for this patient. Patient enrolled in financial support copay assistance and for administration assistance. Information discussed with Dr. Parul Sheth prior to submission. Patient completes her JCV testing at infusion center.

## 2023-06-22 ENCOUNTER — TELEPHONE (OUTPATIENT)
Dept: NEUROLOGY | Facility: CLINIC | Age: 43
End: 2023-06-22

## 2023-06-22 NOTE — TELEPHONE ENCOUNTER
Tysabri infusion record received from [place] [address]  for physician review. No signs/symptoms of infusion reaction noted. Patient infused on 6/21/2023. No longer required to stay for the post infusion observation period. Lot Number [id number] x1, Exp 02/2026  No new orders requested. Orders active for 9 additional infusions. Next infusion scheduled for 7/20/2023. Patient infuses every 4 weeks. Placed in physician's box for review. Message sent to patient that she is due for her annual office follow up.

## 2023-08-04 ENCOUNTER — TELEPHONE (OUTPATIENT)
Dept: NEUROLOGY | Facility: CLINIC | Age: 43
End: 2023-08-04

## 2023-08-04 NOTE — TELEPHONE ENCOUNTER
Spoke with patient and advised her that she needs to contact [facility] to get her Tysabri scheduled, or else they will need to send medication back. Pt verbalized understanding and expresses intent to reach out to them to schedule. Also reminded patient of upcoming appointment with Jyotsna Steel on 8/31/23.

## 2023-08-04 NOTE — TELEPHONE ENCOUNTER
Leyda with [facility] calling regarding patient. Advised that patient cancelled scheduled infusion on 7/20 stating she will call back to schedule. She did not call back. [facility] has called 2x attempting to schedule patient with no success. LVM again for patient today to advise they need to schedule in the next few days or they will need to send medication back to the pharmacy.     ALEJANDRO for Dr. Kaycee Mohan

## 2023-08-31 ENCOUNTER — OFFICE VISIT (OUTPATIENT)
Dept: NEUROLOGY | Facility: CLINIC | Age: 43
End: 2023-08-31
Payer: COMMERCIAL

## 2023-08-31 VITALS
WEIGHT: 160 LBS | RESPIRATION RATE: 16 BRPM | HEART RATE: 76 BPM | SYSTOLIC BLOOD PRESSURE: 112 MMHG | DIASTOLIC BLOOD PRESSURE: 70 MMHG | HEIGHT: 66 IN | BODY MASS INDEX: 25.71 KG/M2

## 2023-08-31 DIAGNOSIS — G35 MS (MULTIPLE SCLEROSIS) (HCC): Primary | ICD-10-CM

## 2023-08-31 PROCEDURE — 3078F DIAST BP <80 MM HG: CPT | Performed by: PHYSICIAN ASSISTANT

## 2023-08-31 PROCEDURE — 99213 OFFICE O/P EST LOW 20 MIN: CPT | Performed by: PHYSICIAN ASSISTANT

## 2023-08-31 PROCEDURE — 3074F SYST BP LT 130 MM HG: CPT | Performed by: PHYSICIAN ASSISTANT

## 2023-08-31 PROCEDURE — 3008F BODY MASS INDEX DOCD: CPT | Performed by: PHYSICIAN ASSISTANT

## 2023-09-07 ENCOUNTER — TELEPHONE (OUTPATIENT)
Dept: NEUROLOGY | Facility: CLINIC | Age: 43
End: 2023-09-07

## 2023-09-07 ENCOUNTER — MED REC SCAN ONLY (OUTPATIENT)
Dept: NEUROLOGY | Facility: CLINIC | Age: 43
End: 2023-09-07

## 2023-09-07 NOTE — TELEPHONE ENCOUNTER
Received call from [phone] requesting continued Tysabri infusion orders and clinical office notes to obtain prior authorization. Information faxed to [phone] with receipt confirmation. Paperwork to scan on med rec scan dated 9/7/2023.

## 2023-09-19 ENCOUNTER — TELEPHONE (OUTPATIENT)
Dept: NEUROLOGY | Facility: CLINIC | Age: 43
End: 2023-09-19

## 2023-09-19 NOTE — TELEPHONE ENCOUNTER
Tysabri infusion record received from [facility] for physician review. No signs/symptoms of infusion reaction noted. Patient infused on 9/19/2023. No longer required to stay for the post infusion observation period. Lot Number [id number] x1, Exp 02/2026  No orders requested. Orders active for 11 additional infusions. Next infusion scheduled for 10/24/2023. Patient infuses every 4 weeks. Placed in physician's box for review.

## 2023-10-24 ENCOUNTER — TELEPHONE (OUTPATIENT)
Dept: NEUROLOGY | Facility: CLINIC | Age: 43
End: 2023-10-24

## 2023-10-24 NOTE — TELEPHONE ENCOUNTER
Tysabri infusion record received from [facility] center for physician review. No signs/symptoms of infusion reaction noted. Patient infused on 10/24/2023. No longer required to to stay for the post infusion observation period. Lot Number [id number] x1, Exp 02/2026  No new orders requested. Orders active for 10 additional infusions. Next infusion scheduled for 11/21/2023. Patient infuses every 4 weeks. Placed in physician's box for review. Patient to have JCV index drawn at her next infusion.

## 2023-11-16 ENCOUNTER — TELEPHONE (OUTPATIENT)
Dept: NEUROLOGY | Facility: CLINIC | Age: 43
End: 2023-11-16

## 2023-11-16 NOTE — TELEPHONE ENCOUNTER
FDA mandated Tysabri reauthorization questionnaire completed online. Patient is JCV IND, Index = 0.24. Last test date May 2023, next test date due Nov 2023. Has completed 85 Tysabri infusions with 0 courses of steroids in last 6 months. Reauthorized to continue receiving infusion; this is not a prior authorization for medication. Patient receives infusions at [facility]. Re-authorized from 12/6/2023 - 6/5/2024. Hilary Baker Tysabri authorized through insurance. Infusion center will maintain insurance authorization. Tysabri is buy and bill. Patient enrolled in the financial support services for Copay assistance and administration copay assistance. Information discussed with Dr. Kimberly Mckoy prior to submission. Next JCV index will be due this month. Patient has the testing completed at [facility] infusion center.

## 2023-11-28 ENCOUNTER — TELEPHONE (OUTPATIENT)
Dept: NEUROLOGY | Facility: CLINIC | Age: 43
End: 2023-11-28

## 2023-11-28 NOTE — TELEPHONE ENCOUNTER
Tysabri infusion record received from [facility] for physician review. No signs/symptoms of infusion reaction noted. Patient infused on 11/28/2023. No observation period required. Lot Number [id number] x1, Exp 12/2026  No new orders requested. Orders active for 9 additional infusions. Next infusion scheduled for 12/26/2023. Infuses every 4 weeks. Placed in physician's box for review.

## 2023-12-01 ENCOUNTER — TELEPHONE (OUTPATIENT)
Dept: NEUROLOGY | Facility: CLINIC | Age: 43
End: 2023-12-01

## 2023-12-01 NOTE — TELEPHONE ENCOUNTER
Dx MS, Rx TYSABRI    Patient calls with general all over pain from [facility] 87 with specific lumbar pain. Was seeing Oscar JEREZ and Sivakumar Zapata MD with pain management and has since been let go. States due to missed procedure appointment. Does not want epidural steroid injections. Does not want physical therapy  Would like NORCO 7.5 and TRAMADOL as she has been taking \"for years\". Is asking for pain management referral.    Routed to provider.

## 2023-12-01 NOTE — TELEPHONE ENCOUNTER
Patient calling, requesting recommendations for pain management physicians that Shanika Burden or Dr. Brent Oliveira would recommend. Please advise.

## 2023-12-06 NOTE — TELEPHONE ENCOUNTER
Pt calling again she still waiting for a call she wants to talk to Nataliia about pain management recommendations

## 2023-12-27 ENCOUNTER — TELEPHONE (OUTPATIENT)
Dept: NEUROLOGY | Facility: CLINIC | Age: 43
End: 2023-12-27

## 2023-12-27 NOTE — TELEPHONE ENCOUNTER
Tysabri infusion record received from [facility] for physician review. No signs/symptoms of infusion reaction noted. Patient infused on 12/26/2023. No longer required to stay for the post infusion observation period. Lot Number [id number] x1, Exp 12/2026  No new orders requested. Orders active for 8 additional infusions. Next infusion scheduled for 01/25/2024. Patient infuses every 4 weeks. Placed in physician's box for review.

## 2024-02-01 ENCOUNTER — TELEPHONE (OUTPATIENT)
Dept: NEUROLOGY | Facility: CLINIC | Age: 44
End: 2024-02-01

## 2024-02-01 NOTE — TELEPHONE ENCOUNTER
Tysabri infusion record received from [facility] for physician review.  No signs/symptoms of infusion reaction noted.  Patient infused on 1/30/2024. No longer required to stay for the post infusion observation period.  Lot Number [id number] x1, Exp 12/2026  No orders requested.  Orders active for 9 additional infusions.  Next infusion scheduled for 2/27/2024. Infuses every 4 weeks.  Placed in physician's box for review.

## 2024-02-28 ENCOUNTER — TELEPHONE (OUTPATIENT)
Dept: NEUROLOGY | Facility: CLINIC | Age: 44
End: 2024-02-28

## 2024-02-28 NOTE — TELEPHONE ENCOUNTER
Tysabri infusion record received from [facility] for physician review.  No sigsn/symptoms of infusion reaction noted.  Patient infused on 2/27/2024. No longer required to stay for the post infusion observation period.  Lot Number [id number] x1, Exp 12/2026  No new orders requested.  Orders Active for 8 additional infusions.  Next infusion scheduled for 3/26/2024. Patient infuses every 4 weeks.  Placed in physician's box for review.

## 2024-03-12 ENCOUNTER — TELEPHONE (OUTPATIENT)
Dept: NEUROLOGY | Facility: CLINIC | Age: 44
End: 2024-03-12

## 2024-03-12 LAB — INDEX VALUE: 0.26

## 2024-04-11 ENCOUNTER — TELEPHONE (OUTPATIENT)
Dept: NEUROLOGY | Facility: CLINIC | Age: 44
End: 2024-04-11

## 2024-04-11 NOTE — TELEPHONE ENCOUNTER
Tysabri infusion record received from [facility] for physician review.  No signs/symptoms of infusion reaction noted.  Patient infused on 4/10/2024. No longer required to stay for the post infusion observation period.  Lot Number [id number] x1, Exp 11/2027  No new orders requested.  Orders active for 7 additional infusions.  Next infusion scheduled for 5/8/2024. Infusing every 4 weeks.  Placed in physician's box for review.

## 2024-04-22 ENCOUNTER — OFFICE VISIT (OUTPATIENT)
Dept: NEUROLOGY | Facility: CLINIC | Age: 44
End: 2024-04-22
Payer: COMMERCIAL

## 2024-04-22 VITALS
SYSTOLIC BLOOD PRESSURE: 106 MMHG | HEART RATE: 78 BPM | BODY MASS INDEX: 25.71 KG/M2 | DIASTOLIC BLOOD PRESSURE: 64 MMHG | HEIGHT: 66 IN | WEIGHT: 160 LBS | RESPIRATION RATE: 16 BRPM

## 2024-04-22 DIAGNOSIS — G35 MS (MULTIPLE SCLEROSIS) (HCC): Primary | ICD-10-CM

## 2024-04-22 PROCEDURE — 3074F SYST BP LT 130 MM HG: CPT | Performed by: OTHER

## 2024-04-22 PROCEDURE — 99214 OFFICE O/P EST MOD 30 MIN: CPT | Performed by: OTHER

## 2024-04-22 PROCEDURE — 3008F BODY MASS INDEX DOCD: CPT | Performed by: OTHER

## 2024-04-22 PROCEDURE — 3078F DIAST BP <80 MM HG: CPT | Performed by: OTHER

## 2024-04-22 RX ORDER — TRAMADOL HYDROCHLORIDE 50 MG/1
50 TABLET ORAL EVERY 6 HOURS PRN
COMMUNITY
Start: 2023-12-19

## 2024-04-22 NOTE — PROGRESS NOTES
NEUROLOGY  [facility]       Regina BOWER Denton  8/7/1980  Primary Care Provider:  No primary care provider on file.    4/22/2024  43 year old yo,  was last seen on:: Aug 2023    Seen for/plans last visit:  MS on Tysabri    Previous visit and existing record notes reviewed in preparation for the face to face visit.  Relevant labs and studies reviewed and will be noted in relevant areas of this record.  Accompanied visit:     (x) No.      Present condition:  She continues to do relatively well with no flare up reported  MRI ordered in August not done      Past History update/new problem(s): as above    Review of Systems:  Review of Systems:  Denies systemic symptoms except for fatigue     No CP or SOB.  No GI or  symptoms. Relevant Neuro as noted above.      Medications:      Current Outpatient Medications:     traMADol 50 MG Oral Tab, Take 1 tablet (50 mg total) by mouth every 6 (six) hours as needed., Disp: , Rfl:     natalizumab 300 MG/15ML Intravenous Conc, Tysabri: Dilute one vial (300 mg/15 ml) in 100 ml of 0.9% Sodium Chloride and infuse over 1 hour every 4 weeks for 12 infusions at [facility]. Patient does not need to stay for the one hour post infusion observation period., Disp: 15 mL, Rfl: 11    Cholecalciferol (VITAMIN D) 2000 units Oral Cap, Take by mouth daily., Disp: , Rfl:     HYDROcodone-acetaminophen  MG Oral Tab, Take 1 tablet by mouth every 8 (eight) hours., Disp: , Rfl:   PRN:     Allergies:  Allergies   Allergen Reactions    Other UNKNOWN    Penicillins           EXAM:  /64 (BP Location: Left arm, Patient Position: Sitting, Cuff Size: adult)   Pulse 78   Resp 16   Ht 66\"   Wt 160 lb (72.6 kg)   BMI 25.82 kg/m²   Looks stated age  General Exam:  HENT:  pink conjunctiva anicteric sclerae  Neck no adenopathy, thyroid normal  Heart and Lungs:  normal  Extremities: no cyanosis, skin changes    NEURO  NEURO  Able to relate events with fluent speech and  intact comprehension  CN 2-12: pupils reactive, VF full face symmetric sensation and movement tongue midline  No motor focal findings  Sensory: no lateralizing findings  Reflexes are symmetric  UMN signs: none  Gait: narrow based          INTERPRETATION of RELEVANT LABS and other DATA:      2/27/24 12:00 AM    JCV ANTIBODY IND   INDEX VALUE 0.26     MRI Brain      Problem/s Identified this visit:   1. MS (multiple sclerosis) (HCC)          Discussion plus Diagnostics & Treatment Orders:      Procedures    z Insight MRI BRAIN (W+WO) (CPT=70553)    z Insight MRI SPINE CERVICAL (CPT=72141)           (x) Discussed potential side effects of any treatment relevant to above.  Includes explanation of tests as necessary.    Return in about 6 months (around 10/22/2024).      Patient understands that if needed, based on condition and or test results, follow up will be readjusted      Juan Castillo MD  Vascular & General Neurology  Director, Multiple Sclerosis Program  [facility]  4/22/2024, Time completed 9:15 AM    Decision making:  ( x ) labs reviewed/ordered - 1  (  ) new diagnosis: - 1  ( x) Images & studies independently reviewed -non F2F  (  ) Case/studies discussed with other caregivers - -non F2F  (  ) Telephone time with patiern or authorized Fam member--non F2F  ( x ) other records reviewed --non F2F including consultations  (  ) [facility] meetings - patient not present --non F2F  (  ) Independent Historian obtained    Non Face to Face CPT code 65899/04309 applies as documented above    PROCEDURE DONE     (   ) see notes        After visit, patient was escorted out and handed-off discharge process and instructions to the check out desk.  No additional issues relevant to visit were raised to staff at this time interval.        This document is to be interpreted as my current opinion regarding the case as of the stated date of service based on the information available to me at this time and may  supersedes any prior opinion expressed either orally or in writing.  Services rendered are only within the scope of direct medical care  Sometimes, reports may have been prepared partially using a speech recognition software technology.  If a word or phrase is confusing or out of context, please do not hesitate to call for clarification.

## 2024-04-22 NOTE — PATIENT INSTRUCTIONS
Refill policies:    Allow 2-3 business days for refills; controlled substances may take longer.  Contact your pharmacy at least 5 days prior to running out of medication and have them send an electronic request or submit request through the “request refill” option in your [facility] account.  Refills are not addressed on weekends; covering physicians do not authorize routine medications on weekends.  No narcotics or controlled substances are refilled after noon on Fridays or by on call physicians.  By law, narcotics must be electronically prescribed.  A 30 day supply with no refills is the maximum allowed.  If your prescription is due for a refill, you may be due for a follow up appointment.  To best provide you care, patients receiving routine medications need to be seen at least once a year.  Patients receiving narcotic/controlled substance medications need to be seen at least once every 3 months.  In the event that your preferred pharmacy does not have the requested medication in stock (e.g. Backordered), it is your responsibility to find another pharmacy that has the requested medication available.  We will gladly send a new prescription to that pharmacy at your request.    Scheduling Tests:    If your physician has ordered radiology tests such as MRI or CT scans, please contact Central Scheduling at [phone] right away to schedule the test.  Once scheduled, the [facility] Centralized Referral Team will work with your insurance carrier to obtain pre-certification or prior authorization.  Depending on your insurance carrier, approval may take 3-10 days.  It is highly recommended patients assure they have received an authorization before having a test performed.  If test is done without insurance authorization, patient may be responsible for the entire amount billed.      Precertification and Prior Authorizations:  If your physician has recommended that you have a procedure or additional testing performed the [facility]  Centralized Referral Team will contact your insurance carrier to obtain pre-certification or prior authorization.    You are strongly encouraged to contact your insurance carrier to verify that your procedure/test has been approved and is a COVERED benefit.  Although the [facility] Centralized Referral Team does its due diligence, the insurance carrier gives the disclaimer that \"Although the procedure is authorized, this does not guarantee payment.\"    Ultimately the patient is responsible for payment.   Thank you for your understanding in this matter.  Paperwork Completion:  If you require FMLA or disability paperwork for your recovery, please make sure to either drop it off or have it faxed to our office at [phone]. Be sure the form has your name and date of birth on it.  The form will be faxed to our Forms Department and they will complete it for you.  There is a 25$ fee for all forms that need to be filled out.  Please be aware there is a 10-14 day turnaround time.  You will need to sign a release of information (MAHOGANY) form if your paperwork does not come with one.  You may call the Forms Department with any questions at [phone].  Their fax number is [phone].

## 2024-05-14 ENCOUNTER — TELEPHONE (OUTPATIENT)
Dept: NEUROLOGY | Facility: CLINIC | Age: 44
End: 2024-05-14

## 2024-05-14 NOTE — TELEPHONE ENCOUNTER
Tysabri infusion record received from [facility] for physician review.  No signs/symptoms of infusion reaction noted.  Patient infused on 5/8/2024. Not required to stay for the post infusion observation period.  Lot Number [id number] x1, Exp 11/2027  No new orders requested.  Orders active for 4 additional infusions.  Next infusion scheduled for 6/5/2024. Infuses every 4 weeks.  Placed in physician's box for review.

## 2024-05-23 ENCOUNTER — TELEPHONE (OUTPATIENT)
Dept: NEUROLOGY | Facility: CLINIC | Age: 44
End: 2024-05-23

## 2024-05-23 NOTE — TELEPHONE ENCOUNTER
FDA mandated Tysabri reauthorization questionnaire completed online. Patient is JCV Neg, Index = 0.26.  Last test date 2/27/2024, next test date due August 2024. Patient has blood drawn at the infusion center.  Has completed 91 Tysabri infusions with 0 courses of steroids in last 6 months.  Reauthorized to continue receiving infusion; this is not a prior authorization for medication.    Patient receives infusions at [facility] in [place].  Re-authorized from 6/6/2024 - 12/5/2024.    Tysabri authorized through insurance. Infusion center will maintain the insurance authoriztions    Tysabri is buy and bill for this patient.    Enrolled in Copay assistance programs.    Information discussed with Dr. Castillo prior to submission.

## 2024-07-11 ENCOUNTER — TELEPHONE (OUTPATIENT)
Dept: NEUROLOGY | Facility: CLINIC | Age: 44
End: 2024-07-11

## 2024-07-11 NOTE — TELEPHONE ENCOUNTER
Tysabri infusion record received from [facility] for physician review.  No signs/symptoms of infusion reaction noted.  Patient infused on 7/10/2024. No observation period required post infusion.  Lot Number [id number] x1, Exp 11/2027  No new orders requested.  Orders active for 3 additional infusions.  Next infusion scheduled for 8/16/2024. Patient infusing every week.  Placed in physician's box for review.

## 2024-08-20 ENCOUNTER — TELEPHONE (OUTPATIENT)
Dept: NEUROLOGY | Facility: CLINIC | Age: 44
End: 2024-08-20

## 2024-08-20 ENCOUNTER — MED REC SCAN ONLY (OUTPATIENT)
Dept: NEUROLOGY | Facility: CLINIC | Age: 44
End: 2024-08-20

## 2024-08-20 NOTE — TELEPHONE ENCOUNTER
Tysabri infusion record received from [facility] for physician review.  No signs/symptoms of infusion reaction noted.  Patient infused on 8/16/2024. Post infusion observation period no longer required.  Lot Number [id number] x1, Exp 02/2027  New orders requested.  Orders to be faxed for 12 additional infusions.  Next infusion scheduled for 9/18/2024. Infuses every 4 weeks.  Placed in physician's box for review.    Orders faxed to [facility] with receipt confirmation.    Orders to scanning on [employer] scan dated: 8/20/2024.

## 2024-09-04 NOTE — TELEPHONE ENCOUNTER
Filled out predetermination for reauthorization of Tysabri    Faxed with clinicals to [phone].  Confirmed received
Per Epic patient [facility]. Needs new prior authorization for infusions.
RN spoke to [address] [place] [address] at [facility] and was informed the pt has to infuse  At [facility] versus [facility]. Jaiden informed the RN that she tried to call the pt and LM for her to call the pt. Reed Myers, [address] phone number is [phone].
Sherryle Griffiths from [employer] calling to request referral be sent to [facility] infusion center to initiate infusions at new infusion site. Referral faxed as requested. [facility] will reach out to patient.
No

## 2024-10-28 ENCOUNTER — TELEPHONE (OUTPATIENT)
Dept: NEUROLOGY | Facility: CLINIC | Age: 44
End: 2024-10-28

## 2024-10-28 NOTE — TELEPHONE ENCOUNTER
Tysabri infusion record received from [facility] for physician review.  No signs/symptoms of infusion reaction noted.  Patient infused on 10/23/24. Post infusion observation period no longer required.  Lot Number [id number] x 1, exp 12/27  No new orders needed, patient has 11 remaining infusions.  Next infusion scheduled for 11/21/24. Infuses every 4 weeks.  Placed in physician's box for review.

## 2024-11-04 ENCOUNTER — TELEPHONE (OUTPATIENT)
Dept: NEUROLOGY | Facility: CLINIC | Age: 44
End: 2024-11-04

## 2024-11-04 DIAGNOSIS — G35 MS (MULTIPLE SCLEROSIS) (HCC): Primary | ICD-10-CM

## 2024-11-04 NOTE — TELEPHONE ENCOUNTER
FDA mandated Tysabri reauthorization questionnaire completed online. Patient is JCV Neg, Index = 0.26.  Last test date 3/12/2024, next test date due August 2024. Patient has blood drawn at the infusion center.  Has completed 94 Tysabri infusions with 0 courses of steroids in last 6 months.  Reauthorized to continue receiving infusion; this is not a prior authorization for medication.     Patient receives infusions at [facility] in [place].  Re-authorized from 12/6/24 - 6/6/25.     Tysabri authorized through insurance. Infusion center will maintain the insurance authorizations     Tysabri is buy and bill for this patient.     Enrolled in Copay assistance programs.     Information discussed with Dr. Castillo prior to submission.    JCV order placed and mailed to patient's home.

## 2024-11-20 ENCOUNTER — TELEPHONE (OUTPATIENT)
Dept: NEUROLOGY | Facility: CLINIC | Age: 44
End: 2024-11-20

## 2024-11-20 NOTE — TELEPHONE ENCOUNTER
Received call from Samira at [facility].  Patient scheduled to infuse TYSABRI tomorow and recently had a cracked tooth that was infected and pulled on 11/15/2024. Patient has two more days of CLINDAMYCIN.    Patient states tooth is still painful although better, only able to eat on one side.  Denies fever.    Routed to provider.    Please call Samira @ [facility] with physician's recommendation.

## 2024-11-26 NOTE — TELEPHONE ENCOUNTER
Received another call from Samira regarding patient who has now completed antibiotics. Informed Samira that she may schedule for TYSABRI infusion.

## 2024-12-20 ENCOUNTER — TELEPHONE (OUTPATIENT)
Dept: NEUROLOGY | Facility: CLINIC | Age: 44
End: 2024-12-20

## 2024-12-20 NOTE — TELEPHONE ENCOUNTER
Received call from [facility] informing us that they have tried to reach out to the patient to schedule her for her Tysabri infusion however she is not returning their calls.  Last infusion was on 10/23/24.    Called patient and informed her that [facility] is trying to reach out to her to schedule her infusion.  She reports she received the messages however she has been very busy but she will call them back and schedule before the end of the year.

## 2025-01-06 ENCOUNTER — TELEPHONE (OUTPATIENT)
Dept: NEUROLOGY | Facility: CLINIC | Age: 45
End: 2025-01-06

## 2025-01-06 NOTE — TELEPHONE ENCOUNTER
Called and informed [facility] infusion center that ok to have patient schedule for her infusion this month.    No further questions.

## 2025-01-06 NOTE — TELEPHONE ENCOUNTER
Samira with [facility] calling. Advised that patient has missed Nov/Dec infusions, but patient has called to schedule for January. Wants to confirm this is ok with Dr. Castillo. Patient looking to schedule for this Wednesday 1/8.    Phone for nurse line [phone].    Please advise.

## 2025-01-09 ENCOUNTER — TELEPHONE (OUTPATIENT)
Dept: NEUROLOGY | Facility: CLINIC | Age: 45
End: 2025-01-09

## 2025-01-09 NOTE — TELEPHONE ENCOUNTER
Tysabri infusion record received from [facility] for physician review.  No signs/symptoms of infusion reaction noted.  Patient infused on 1/8/2025. Patient was overdue for this infusion. Previous infusion was 10/2024.  Lot Number [id number] x1, Exp 12/2027  No new orders requested.  Orders active for 10 additional infusions.  Next infusion scheduled for 2/5/2025. Patient infuses every 4 weeks.  Placed in physician's box for review.

## 2025-02-07 ENCOUNTER — TELEPHONE (OUTPATIENT)
Dept: NEUROLOGY | Facility: CLINIC | Age: 45
End: 2025-02-07

## 2025-02-07 NOTE — TELEPHONE ENCOUNTER
Tysabri infusion record received from [facility] for physician review.  No signs/symptoms of infusion reaction noted.  Patient infused on 2/6/2025. Patient was overdue for this infusion. Previous infusion was 1/9/2025.  Lot Number [id number] x1, Exp 12/2027  No new orders requested.  Orders active for 9 additional infusions.  Next infusion scheduled for 3/6/2025. Patient infuses every 4 weeks.  Placed in physician's box for review.

## 2025-02-14 ENCOUNTER — TELEPHONE (OUTPATIENT)
Dept: NEUROLOGY | Facility: CLINIC | Age: 45
End: 2025-02-14

## 2025-02-14 LAB — Lab: 0.25

## 2025-03-10 ENCOUNTER — TELEPHONE (OUTPATIENT)
Dept: NEUROLOGY | Facility: CLINIC | Age: 45
End: 2025-03-10

## 2025-03-10 NOTE — TELEPHONE ENCOUNTER
Tysabri infusion record received from [facility] for physician review.  No signs/symptoms of infusion reaction noted.  Patient infused on 3/6/2025. Patient was overdue for this infusion. Previous infusion was 1/9/2025.  Lot Number [id number] x1, Exp 12/2027  No new orders requested.  Orders active for 8 additional infusions.  Next infusion scheduled for 4/3/2025. Patient infuses every 4 weeks.  Placed in physician's box for review.

## 2025-04-18 ENCOUNTER — TELEPHONE (OUTPATIENT)
Dept: NEUROLOGY | Facility: CLINIC | Age: 45
End: 2025-04-18

## 2025-04-18 NOTE — TELEPHONE ENCOUNTER
Tysabri infusion record received from [facility] for physician review.  No signs/symptoms of infusion reaction noted.  Patient infused on 3/6/2025. Patient was overdue for this infusion. Previous infusion was 4/17/2025.  Lot Number [id number] x1, Exp 12/2027  No new orders requested.  Orders active for 7 additional infusions.  Next infusion scheduled for 5/22/2025. Patient infuses every 4 weeks.  Last JCV on 2/6/2025 was 0.25  Placed in physician's box for review.

## 2025-05-08 ENCOUNTER — TELEPHONE (OUTPATIENT)
Dept: NEUROLOGY | Facility: CLINIC | Age: 45
End: 2025-05-08

## 2025-05-22 ENCOUNTER — TELEPHONE (OUTPATIENT)
Dept: NEUROLOGY | Facility: CLINIC | Age: 45
End: 2025-05-22

## 2025-07-10 ENCOUNTER — TELEPHONE (OUTPATIENT)
Dept: NEUROLOGY | Facility: CLINIC | Age: 45
End: 2025-07-10

## 2025-07-10 NOTE — TELEPHONE ENCOUNTER
Infusion record received from [facility] for provider to review.  Patient received 7 of 12 Tysabri doses.  Patient received 300 mg of Tysabri on 06/19/2025.  Lot number [id number]  Exp 11/2028.Patient tolerated infusion well. No infusion reaction noted. Patient due for next infusion on 7/22/2025.

## 2025-07-22 ENCOUNTER — TELEPHONE (OUTPATIENT)
Dept: NEUROLOGY | Facility: CLINIC | Age: 45
End: 2025-07-22

## 2025-07-22 DIAGNOSIS — G35 MS (MULTIPLE SCLEROSIS) (HCC): Primary | ICD-10-CM

## 2025-07-22 NOTE — TELEPHONE ENCOUNTER
Next infusion is 8/21/25. Authorization expires in September. Updated progress notes are asked for and faxed to [phone].   Confirmation received.

## 2025-07-23 NOTE — TELEPHONE ENCOUNTER
Received request for new orders and also office visit notes.     Tysabri infusion record received from [facility] for physician review.  No infusion reaction noted.  Patient infused on 7/22/25  Lot Number [id number] x 1, Exp 11/28  New orders requested.   Next infusion scheduled for 8/21/25.  Placed in physician's box for review.

## 2025-07-23 NOTE — TELEPHONE ENCOUNTER
Orders needing review by provider then will be faxed along with lab orders    [facility] lab orders printed and mailed to patient's house.

## 2025-07-23 NOTE — TELEPHONE ENCOUNTER
Renewal of Tysabri orders faxed to [phone]. Confirmation received. See Med-rec scan dated 7/23/25

## 2025-08-22 ENCOUNTER — TELEPHONE (OUTPATIENT)
Dept: NEUROLOGY | Facility: CLINIC | Age: 45
End: 2025-08-22

## 2025-08-29 ENCOUNTER — TELEPHONE (OUTPATIENT)
Dept: NEUROLOGY | Facility: CLINIC | Age: 45
End: 2025-08-29

## (undated) DIAGNOSIS — G35 MULTIPLE SCLEROSIS (HCC): ICD-10-CM

## (undated) NOTE — LETTER
06/13/2018      [address]  [address] [address]      Dear Isidro Huertas,     We are contacting you from [facility] office. Your health is important to us.  We have not received test results for additional tests that your p

## (undated) NOTE — MR AVS SNAPSHOT
After Visit Summary   5/31/2017    Kristian Jimenez    MRN: [id number]           Diagnoses this Visit     Multiple sclerosis ([facility]    -  Primary       Allergies     Penicillins       Your Vital Signs Were     BP Pulse Resp Weight SpO2 Smoking Sta

## (undated) NOTE — MR AVS SNAPSHOT
After Visit Summary   4/18/2017    Reinaldo Pedroza    MRN: [id number]           Diagnoses this Visit     [facility] ([facility]    -  Primary       Allergies     Penicillins       Your Vital Signs Were     BP Pulse Temp(Src) Resp    100/60 mmH

## (undated) NOTE — LETTER
8/4/2020        Ching Doss  8/7/1980      To Whom It May Concern:    Please allow Ching Doss to be discharged after completing her Tysabri infusions. She may leave prior to completing the 1 hour observation period if stable.

## (undated) NOTE — MR AVS SNAPSHOT
[address] [facility] [address] [phone]               Thank you for choosing us for your health care visit with SOLITARIO Madden.   We are glad to serve you and happy to provide you with this summary of your visit family member will be picking up prescription, office must be given name of individual in advance and they must present an ID as well. ? The name of the person picking up your prescription must be documented in your chart.   Scheduling Tests    If your phy Take 1 tab po daily x 3 days, then 2 tabs po daily   Commonly known as:  CYMBALTA           HYDROcodone-acetaminophen  MG Tabs   Take 1 tablet by mouth every 8 (eight) hours.    Commonly known as:  NORCO           modafinil 200 MG Tabs   Take 1 tab po

## (undated) NOTE — Clinical Note
06/22/2017        [address]  [address] [facility] [address]      Dear Nerissa Tobias,     We are contacting you from Dr. Dennis Encinas office. Your health is important to us.  We have not received test results for additional tests that your

## (undated) NOTE — LETTER
01/08/2018            [address] [address]      Dear Ester Beckman,     We are contacting you from Dr. Gm Umana office. Your health is important to us.  We have not received test results for additional tests that

## (undated) NOTE — MR AVS SNAPSHOT
After Visit Summary   1/9/2017    Ama Bishop    MRN: [id number]           Diagnoses this Visit     Multiple sclerosis [facility])    -  Primary       Allergies     Penicillins       Your Vital Signs Were     BP Pulse Temp(Src)    112/66 mmHg 70 9

## (undated) NOTE — LETTER
20      Ale Dutton  :  1980      To Whom It May Concern: This patient may leave 15 min after her IVIG infusion  If this office may be of further assistance, please do not hesitate to contact us.       Sincerely,        Layo Leon RN

## (undated) NOTE — LETTER
12/07/2017          [place] [address]      Dear Marquita Granados,     We are contacting you from Dr. Paloma Chopra office. Your health is important to us.  We have not received test results for additional tests that you

## (undated) NOTE — LETTER
07/19/2018      [address] [place] [address]      Dear Marvin Michel,     We are contacting you from [facility] office. Your health is important to us.  We have not received test results for additional tests that your p

## (undated) NOTE — LETTER
12/11/2018            [address]  [address] [address]      Dear Yun Lopez,     We are contacting you from [facility] office. Your health is important to us.  We have not received test results for additional tests that